# Patient Record
Sex: MALE | Race: WHITE | NOT HISPANIC OR LATINO | Employment: FULL TIME | URBAN - NONMETROPOLITAN AREA
[De-identification: names, ages, dates, MRNs, and addresses within clinical notes are randomized per-mention and may not be internally consistent; named-entity substitution may affect disease eponyms.]

---

## 2017-04-17 ENCOUNTER — OFFICE VISIT - GICH (OUTPATIENT)
Dept: FAMILY MEDICINE | Facility: OTHER | Age: 37
End: 2017-04-17

## 2017-04-17 ENCOUNTER — HISTORY (OUTPATIENT)
Dept: FAMILY MEDICINE | Facility: OTHER | Age: 37
End: 2017-04-17

## 2017-04-17 DIAGNOSIS — J01.00 ACUTE MAXILLARY SINUSITIS: ICD-10-CM

## 2017-09-14 ENCOUNTER — HISTORY (OUTPATIENT)
Dept: FAMILY MEDICINE | Facility: OTHER | Age: 37
End: 2017-09-14

## 2017-09-14 ENCOUNTER — OFFICE VISIT - GICH (OUTPATIENT)
Dept: FAMILY MEDICINE | Facility: OTHER | Age: 37
End: 2017-09-14

## 2017-09-14 DIAGNOSIS — R05.9 COUGH: ICD-10-CM

## 2017-09-14 DIAGNOSIS — J06.9 ACUTE UPPER RESPIRATORY INFECTION: ICD-10-CM

## 2017-11-14 ENCOUNTER — AMBULATORY - GICH (OUTPATIENT)
Dept: FAMILY MEDICINE | Facility: OTHER | Age: 37
End: 2017-11-14

## 2017-11-14 DIAGNOSIS — Z23 ENCOUNTER FOR IMMUNIZATION: ICD-10-CM

## 2017-12-28 NOTE — PATIENT INSTRUCTIONS
Patient Information     Patient Name MRN Sex Mikel Pitt [id number] Male 1980      Patient Instructions by Jordana Guerrero NP at 2017 12:30 PM     Author:  Jordana Guerrero NP Service:  (none) Author Type:  PHYS- Nurse Practitioner     Filed:  2017  1:00 PM Encounter Date:  2017 Status:  Signed     :  Jordana Guerrero NP (PHYS- Nurse Practitioner)            Azithromycin daily x 5 days       Most coughs are caused by a viral infection.   Usually coughs can last 2 to 3 weeks. Sometimes the cough becomes loose (wet) for a few days, and your child coughs up a lot of phlegm (mucus). This is usually a sign that the end of the illness is near.    Most sore throats are caused by viruses and are part of a cold. About 10% of sore throats are caused by strep bacteria.    Encouraged fluids and rest.    May use symptomatic care with tylenol or ibuprofen.     Using a humidifier works well to break up the congestion.     Elevate the mattress to 15 degrees in order to help with the congestion.    Frequent swallows of cool liquid.      Oatmeal or honey coats the throat and some patients find it soothes the pain.     Salt water gargles as needed    Return to clinic with change/worsening of symptoms or concerns.

## 2017-12-28 NOTE — PROGRESS NOTES
Patient Information     Patient Name MRN Sex Mikel Pitt [id number] Male 1980      Progress Notes by Jordana Guerrero NP at 2017 12:30 PM     Author:  Jordana Guerrero NP Service:  (none) Author Type:  PHYS- Nurse Practitioner     Filed:  2017  1:03 PM Encounter Date:  2017 Status:  Signed     :  Jordana Guerrero NP (PHYS- Nurse Practitioner)            HPI:    Mikel Lincoln is a 37 y.o. male who presents to clinic today for URI.   States he has had a URI for the past 3.5 weeks, started to improve and now worsening the past few days.   Also states his wife has pneumonia and is being treated and his children are also on antibiotics.  States he has hit a brick wall the past 3 days.  His symptoms include irritated throat, cough, fatigued, weak, exhausted, nasal/sinus congestion, ears with mild plugged feeling, ringing in ears.  Minimally productive cough - productive initially.  Cough is persistent.  Some chest tightness.  No shortness of breath.  Lots of nasal drainage the past few days.   Sinus pressure and fogging feeling.  No headaches.   Drinking lots of water.  Appetite fair.   Taking airborne and vitamin C.  Taking Mucinex and decongestant.  Smokes leisurely, none recently.          Past Medical History:     Diagnosis  Date     No Significant Past Medical History      Past Surgical History:      Procedure  Laterality Date     APPENDECTOMY       TYMPANOSTOMY       Social History     Substance Use Topics       Smoking status: Current Some Day Smoker     Smokeless tobacco: Never Used     Alcohol use Yes     No current outpatient prescriptions on file.     No current facility-administered medications for this visit.      Medications have been reviewed by me and are current to the best of my knowledge and ability.    No Known Allergies    Past medical history, past surgical history, current medications and allergies reviewed and accurate to the best of my knowledge.         ROS:  Refer to HPI    /72  Pulse 56  Temp 96.2  F (35.7  C) (Tympanic)   Wt 108.7 kg (239 lb 9.6 oz)  BMI 31.18 kg/m2    EXAM:  General Appearance: Well appearing adult male, appropriate appearance for age. No acute distress  Head: normocephalic, atraumatic  Ears: Left TM with bony landmarks appreciated, no erythema, no effusion, no bulging, no purulence.  Right TM with bony landmarks appreciated, no erythema, no effusion, no bulging, no purulence.   Left auditory canal clear.  Right auditory canal clear.  Normal external ears, non tender.  Eyes: conjunctivae normal, no drainage  Orophayrnx: moist mucous membranes, posterior pharynx without erythema, tonsils without hypertrophy, no erythema, no exudates or petechiae, no post nasal drip seen.    Sinuses:  No sinus tenderness upon palpation of the frontal, maxillary, or ethmoid sinuses  Neck: supple without adenopathy  Respiratory: normal chest wall and respirations.  Normal effort.  Clear to auscultation bilaterally, no wheezing, crackles or rhonchi.  No increased work of breathing.  Frequent dry cough appreciated.  Cardiac: RRR with no murmurs  Musculoskeletal:  Normal gait.  Equal movement of bilateral upper extremities.  Equal movement of bilateral lower extremities.    Psychological: normal affect, alert and pleasant          ASSESSMENT/PLAN:    ICD-10-CM    1. Persistent cough for 3 weeks or longer R05 azithromycin (ZITHROMAX Z-LOWELL) 250 mg tablet   2. URI with cough and congestion J06.9          Azithromycin daily x 5 days (z lowell dosing)  Encouraged fluids  Symptomatic treatment - salt water gargles, honey, elevation, humidifier, sinus rinse/netti pot, lozenges, saline nasal spray, etc   Tylenol or ibuprofen PRN  Follow up if symptoms persist or worsen or concerns          Patient Instructions   Azithromycin daily x 5 days       Most coughs are caused by a viral infection.   Usually coughs can last 2 to 3 weeks. Sometimes the cough becomes loose  (wet) for a few days, and your child coughs up a lot of phlegm (mucus). This is usually a sign that the end of the illness is near.    Most sore throats are caused by viruses and are part of a cold. About 10% of sore throats are caused by strep bacteria.    Encouraged fluids and rest.    May use symptomatic care with tylenol or ibuprofen.     Using a humidifier works well to break up the congestion.     Elevate the mattress to 15 degrees in order to help with the congestion.    Frequent swallows of cool liquid.      Oatmeal or honey coats the throat and some patients find it soothes the pain.     Salt water gargles as needed    Return to clinic with change/worsening of symptoms or concerns.

## 2017-12-30 NOTE — NURSING NOTE
Patient Information     Patient Name MRN Sex Mikel Pitt [id number] Male 1980      Nursing Note by Angely Castillo at 2017 12:30 PM     Author:  Angely Castillo Service:  (none) Author Type:  (none)     Filed:  2017 12:53 PM Encounter Date:  2017 Status:  Signed     :  Angely Castillo            Patient presents today for sore throat, congestion, ears ringing, and cant sleep at night.  Patients cough started 4 weeks ago, got a little better, then this week it has gotten worse.    Angely Castillo LPN..............2017 12:42 PM

## 2018-01-04 NOTE — NURSING NOTE
Patient Information     Patient Name MRN Sex Mikel Pitt [id number] Male 1980      Nursing Note by Shelia Prieto at 2017  3:00 PM     Author:  Shelia Prieto Service:  (none) Author Type:  (none)     Filed:  2017  3:30 PM Encounter Date:  2017 Status:  Signed     :  Shelia Prieto            Patient presents to clinic with ear pain and sinus pain.  Shelia Mcdermott ....................  2017   3:18 PM

## 2018-01-04 NOTE — PATIENT INSTRUCTIONS
Patient Information     Patient Name MRN Sex Mikel Pitt [id number] Male 1980      Patient Instructions by Martine Sousa NP at 2017  3:00 PM     Author:  Martine Sousa NP Service:  (none) Author Type:  PHYS- Nurse Practitioner     Filed:  2017  3:31 PM Encounter Date:  2017 Status:  Signed     :  Martine Sousa NP (PHYS- Nurse Practitioner)            Sinusitis   ________________________________________________________________________  KEY POINTS    Sinusitis is swelling and irritation of the linings of the sinuses, the hollow spaces in the bones of your face and front of your skull.    You may need to take medicine for your stuffy nose, pain, infection, or swelling.    Use saline nasal sprays or rinses to help wash out nasal passages if you have a sinus infection. A humidifier can add moisture to the air also.  ________________________________________________________________________  What is sinusitis?  Sinusitis is swelling and irritation of the linings of the sinuses. The sinuses are hollow spaces in the bones of your face and front of your skull. They connect with the nose through small openings. Like the nose, they are lined with tissue (membranes) that make mucus. Mucus drains through the small openings to the nose.  What is the cause?  The passageways from the sinuses to the nose are very narrow. When drainage of mucus from the sinuses is blocked, the sinuses get swollen and irritated. They may also become infected with bacteria, a virus, or even fungus. Allergies or irritation from pollen, mold, dust, or smoke can also cause swelling of the sinuses. Sometimes a tooth infection spreads to the sinuses.  You may be more likely to get sinus congestion and infections if you have:    Severe or untreated seasonal or year-round allergies    Injured the bones in your nose    A deformity of the nose that causes the sinuses not to drain properly    Small growths called  polyps in the sinuses that partially block the sinus openings  What are the symptoms?  Symptoms may include:    Feeling of fullness or pressure in your face or head    A headache that is most painful when you first wake up in the morning or when you bend over and put your head down    Pain in your face    Aching in the upper jaw and teeth    Runny or stuffy nose    Cough, especially at night    Fluid draining down the back of your throat (postnasal drip)    Sore throat, especially in the morning or evening  How is it diagnosed?  Your healthcare provider will ask about your symptoms and medical history and examine you. Tests are often not needed but may include:    X-ray of your sinuses    CT scan, which uses X-rays and a computer to show detailed pictures of the sinuses  How is it treated?  Several kinds of medicine may help:    Nonprescription pain medicine, such as acetaminophen, ibuprofen, or naproxen. Read the label and take as directed. Unless recommended by your healthcare provider, you should not take these medicines for more than 10 days.    Nonsteroidal anti-inflammatory medicines (NSAIDs), such as ibuprofen, naproxen, and aspirin, may cause stomach bleeding and other problems. These risks increase with age.    Acetaminophen may cause liver damage or other problems. Unless recommended by your provider, don't take more than 3000 milligrams (mg) in 24 hours. To make sure you don t take too much, check other medicines you take to see if they also contain acetaminophen. Ask your provider if you need to avoid drinking alcohol while taking this medicine.    Decongestants pills or nasal sprays to reduce swelling in your nose and sinuses and lessen the amount of mucus. Use decongestants as directed. If you are using a nonprescription nasal-spray decongestant, generally you should not use it for more than 3 days. After 3 days it may make your symptoms worse. Ask your healthcare provider if it is OK for you to use a  nasal spray decongestant longer than this.    Antihistamine tablets or a nasal spray to treat the allergies during your allergy season or, in some cases, year-round. Antihistamines block the effect of a chemical your body makes when you have an allergic reaction.    Antibiotics, if your provider thinks you might have a sinus infection    Steroid nasal spray if your provider thinks it may help clear your sinuses  If sinusitis is still a problem despite treatment, you may be referred to an allergy specialist or an ear, nose, and throat (ENT) specialist. The allergy specialist will check for and help treat your allergies to lessen the chance of having sinusitis. The ENT specialist will check for polyps or a deformed bone that may be blocking your sinuses. You may need surgery to create an extra or enlarged passageway to help your sinuses drain more easily.  Depending on what caused the sinusitis and how severe it is, it may last for days or weeks. For most cases of sinusitis, the symptoms get better gradually over 3 to 10 days.  How can I take care of myself?  Follow the full course of treatment prescribed by your healthcare provider. In addition:    If you are taking an antibiotic, take all of it as directed by your provider. If you stop taking the medicine when your symptoms are gone but before you have taken all of the medicine, symptoms may come back.    Don t smoke, and stay away from others who are smoking.    If you have allergies, try to avoid the things you are allergic to, like animal dander. Use medicine to keep your nose and sinuses open.    Use a humidifier to put more moisture in the air. This can help to open blocked sinuses and relieve pain. Avoid steam vaporizers because they can cause burns. Be sure to keep the humidifier clean, as recommended in the 's instructions. It's important to keep bacteria and mold from growing in the water container.    Use saline nasal sprays or rinses to help  wash out nasal passages if you have a sinus infection. You may use the sprays also to prevent infections.    Get plenty of rest.    Drink more fluids to keep the mucus as thin as possible so your sinuses can drain more easily.    Raise the head of your bed slightly or sleep on extra pillows to help your sinuses drain.    Put warm, moist cloths on painful areas.  Ask your healthcare provider:    How and when you will get your test results    How long it will take to recover    If there are activities you should avoid and when you can return to your normal activities    How to take care of yourself at home    What symptoms or problems you should watch for and what to do if you have them  Make sure you know when you should come back for a checkup. Keep all appointments for provider visits or tests.  How can I help prevent sinusitis?    Treat your colds and allergies promptly. Use decongestants as soon as you start having symptoms, and before you fly, travel to high altitudes, or swim in deep water.    If you smoke, try to quit. Talk to your healthcare provider about ways to quit smoking.

## 2018-01-04 NOTE — PROGRESS NOTES
Patient Information     Patient Name MRN Sex Mikel Pitt [id number] Male 1980      Progress Notes by Martine Sousa NP at 2017  3:00 PM     Author:  Martine Sousa NP Service:  (none) Author Type:  PHYS- Nurse Practitioner     Filed:  2017  3:45 PM Encounter Date:  2017 Status:  Signed     :  Martine Sousa NP (PHYS- Nurse Practitioner)            Nursing Notes:   Shelia Prieto  2017  3:30 PM  Signed  Patient presents to clinic with ear pain and sinus pain.  Shelia PrietoLPN ....................  2017   3:18 PM      SUBJECTIVE:    Mikel Lincoln is a 37 y.o. male who presents for ear pain and sinus congestion    URI    This is a new problem. Episode onset: 2 weeks. The problem has been gradually worsening. There has been no fever. Associated symptoms include congestion, headaches, a plugged ear sensation, rhinorrhea, sinus pain and sneezing. Pertinent negatives include no coughing, ear pain, sore throat, swollen glands, vomiting or wheezing. He has tried decongestant, increased fluids and sleep for the symptoms. The treatment provided mild relief.       No current outpatient prescriptions on file prior to visit.     No current facility-administered medications on file prior to visit.        REVIEW OF SYSTEMS:  Review of Systems   HENT: Positive for congestion, rhinorrhea and sneezing. Negative for ear pain and sore throat.    Respiratory: Negative for cough and wheezing.    Gastrointestinal: Negative for vomiting.   Neurological: Positive for headaches.       OBJECTIVE:  /74  Pulse 60  Temp 98.6  F (37  C) (Tympanic)  Resp 20  Wt 105.9 kg (233 lb 6.4 oz)  BMI 30.38 kg/m2    EXAM:   Physical Exam   Constitutional: He is well-developed, well-nourished, and in no distress.   HENT:   Head: Normocephalic and atraumatic.   Right Ear: Tympanic membrane and ear canal normal.   Left Ear: Tympanic membrane and ear canal normal.   Nose: Rhinorrhea  present. Right sinus exhibits maxillary sinus tenderness. Right sinus exhibits no frontal sinus tenderness. Left sinus exhibits maxillary sinus tenderness. Left sinus exhibits no frontal sinus tenderness.   Mouth/Throat: Uvula is midline, oropharynx is clear and moist and mucous membranes are normal.   Sinus pressure noted, not really painful   Eyes: Conjunctivae are normal.   Neck: Neck supple.   Cardiovascular: Normal rate, regular rhythm and normal heart sounds.    Pulmonary/Chest: Effort normal and breath sounds normal. No respiratory distress. He has no wheezes. He has no rales.   Lymphadenopathy:     He has no cervical adenopathy.   Skin: Skin is warm and dry.   Nursing note and vitals reviewed.      ASSESSMENT/PLAN:    ICD-10-CM    1. Acute non-recurrent maxillary sinusitis J01.00 azithromycin (ZITHROMAX) 250 mg tablet        Plan:  Discussed Risks and benefits of abx, vs watchful waiting, he was not having it. He believes he needs an abx right now. Discussed sinusitis and risk of treating too soon. He will also use OTC. I explained my diagnostic considerations and recommendations to the patient, who voiced understanding and agreement with the treatment plan. All questions were answered. We discussed potential side effects of any prescribed or recommended therapies, as well as expectations for response to treatments. He was advised to contact our office if there is no improvement or worsening of conditions or symptoms.  If s/s worsen or persist, patient will either come back or follow up with PCP.       GABRIELLA DIAMOND NP ....................  4/17/2017   3:45 PM

## 2018-01-27 VITALS
BODY MASS INDEX: 31.18 KG/M2 | TEMPERATURE: 96.2 F | HEART RATE: 56 BPM | WEIGHT: 239.6 LBS | SYSTOLIC BLOOD PRESSURE: 114 MMHG | DIASTOLIC BLOOD PRESSURE: 72 MMHG

## 2018-01-27 VITALS
SYSTOLIC BLOOD PRESSURE: 132 MMHG | TEMPERATURE: 98.6 F | WEIGHT: 233.4 LBS | DIASTOLIC BLOOD PRESSURE: 74 MMHG | HEART RATE: 60 BPM | RESPIRATION RATE: 20 BRPM

## 2018-08-21 ENCOUNTER — OFFICE VISIT (OUTPATIENT)
Dept: FAMILY MEDICINE | Facility: OTHER | Age: 38
End: 2018-08-21
Attending: FAMILY MEDICINE
Payer: COMMERCIAL

## 2018-08-21 VITALS
RESPIRATION RATE: 16 BRPM | WEIGHT: 240 LBS | DIASTOLIC BLOOD PRESSURE: 69 MMHG | SYSTOLIC BLOOD PRESSURE: 124 MMHG | HEART RATE: 52 BPM | BODY MASS INDEX: 31.23 KG/M2 | OXYGEN SATURATION: 98 %

## 2018-08-21 DIAGNOSIS — H66.003 ACUTE SUPPURATIVE OTITIS MEDIA OF BOTH EARS WITHOUT SPONTANEOUS RUPTURE OF TYMPANIC MEMBRANES, RECURRENCE NOT SPECIFIED: Primary | ICD-10-CM

## 2018-08-21 PROCEDURE — 99213 OFFICE O/P EST LOW 20 MIN: CPT | Performed by: FAMILY MEDICINE

## 2018-08-21 RX ORDER — AZITHROMYCIN 250 MG/1
TABLET, FILM COATED ORAL
Qty: 6 TABLET | Refills: 0 | Status: SHIPPED | OUTPATIENT
Start: 2018-08-21 | End: 2018-11-29

## 2018-08-21 ASSESSMENT — PAIN SCALES - GENERAL: PAINLEVEL: MILD PAIN (3)

## 2018-08-21 NOTE — NURSING NOTE
Mikel presents to the clinic today for concerns of bilateral ear pain. States that he was swimming this weekend and believes that it developed then. Has been taking ibuprofen to help. Last dose at 7:00 am today.          Shabbir Mayfield LPN 08/21/18 9:21 AM

## 2018-08-21 NOTE — NURSING NOTE
"Chief Complaint   Patient presents with     Ear Problem     Bilateral ear pain       Initial /69 (BP Location: Right arm, Patient Position: Sitting, Cuff Size: Adult Large)  Pulse 52  Resp 16  Wt 240 lb (108.9 kg)  SpO2 98%  BMI 31.23 kg/m2 Estimated body mass index is 31.23 kg/(m^2) as calculated from the following:    Height as of 3/9/16: 6' 1.5\" (1.867 m).    Weight as of this encounter: 240 lb (108.9 kg).  Medication Reconciliation: complete    Shabbir Mayfield LPN    "

## 2018-08-21 NOTE — PROGRESS NOTES
"Nursing Notes:   Shabbir Mayfield LPN  8/21/2018  9:21 AM  Signed  Mikel presents to the clinic today for concerns of bilateral ear pain. States that he was swimming this weekend and believes that it developed then. Has been taking ibuprofen to help. Last dose at 7:00 am today.          Shabbir Mayfield LPN 08/21/18 9:21 AM          Shabbir Mayfield LPN  8/21/2018  9:28 AM  Signed  Chief Complaint   Patient presents with     Ear Problem     Bilateral ear pain       Initial /69 (BP Location: Right arm, Patient Position: Sitting, Cuff Size: Adult Large)  Pulse 52  Resp 16  Wt 240 lb (108.9 kg)  SpO2 98%  BMI 31.23 kg/m2 Estimated body mass index is 31.23 kg/(m^2) as calculated from the following:    Height as of 3/9/16: 6' 1.5\" (1.867 m).    Weight as of this encounter: 240 lb (108.9 kg).  Medication Reconciliation: complete    Shabbir Mayfield LPN        SUBJECTIVE:  Mikel Lincoln is a 38 year old male who reports 1 day history of bilateral ear pain.  It is quite severe.  Reports many years ago having issues with external otitis after swimming.  He was swimming a lot this weekend.  No fevers or chills but has been using ibuprofen for pain.  No other symptoms.    Current Outpatient Prescriptions   Medication Sig Dispense Refill     azithromycin (ZITHROMAX) 250 MG tablet Two tablets first day, then one tablet daily for four days. 6 tablet 0       /69 (BP Location: Right arm, Patient Position: Sitting, Cuff Size: Adult Large)  Pulse 52  Resp 16  Wt 240 lb (108.9 kg)  SpO2 98%  BMI 31.23 kg/m2    Exam:  General Appearance: Pleasant, alert, appropriate appearance for age. No acute distress  Ear Exam: Bilateral TM erythema and bulge/blistering.  TM in tact.  EAC with inflammation but no purulence at present.  Neck Exam: Supple, no masses or nodes.          ASSESSMENT/Plan :        No images are attached to the encounter or orders placed in the encounter.      1. Acute suppurative otitis media of " both ears without spontaneous rupture of tympanic membranes, recurrence not specified  Patient has bilateral otitis media and also seems to have some involvement with external ear canal.  Currently tympanic membranes are intact.  I would suggest treatment with his oral azithromycin to cover both internal ear infection and likely early external ear infection.  We discussed drops and based on current exam I do not think double treatment is necessary.  He can continue to use ibuprofen/Tylenol.  He will follow-up if he has any new or worsening symptoms or if symptoms do not resolve over the next several days.  - azithromycin (ZITHROMAX) 250 MG tablet; Two tablets first day, then one tablet daily for four days.  Dispense: 6 tablet; Refill: 0          There are no Patient Instructions on file for this visit.    Sigifredo Lopez    This document was created using computer generated templates and voiceactivated software.

## 2018-08-21 NOTE — MR AVS SNAPSHOT
"              After Visit Summary   2018    Mikel Lincoln    MRN: [id number]           Patient Information     Date Of Birth          1980        Visit Information        Provider Department      2018 9:00 AM Sigifredo Lopez MD [facility]        Today's Diagnoses     Acute suppurative otitis media of both ears without spontaneous rupture of tympanic membranes, recurrence not specified    -  1       Follow-ups after your visit        Who to contact     If you have questions or need follow up information about today's clinic visit or your schedule please contact [facility] directly at [phone].  Normal or non-critical lab and imaging results will be communicated to you by [email or web address], letter or phone within 4 business days after the clinic has received the results. If you do not hear from us within 7 days, please contact the clinic through [email or web address] or phone. If you have a critical or abnormal lab result, we will notify you by phone as soon as possible.  Submit refill requests through Terviu or call your pharmacy and they will forward the refill request to us. Please allow 3 business days for your refill to be completed.          Additional Information About Your Visit        MyChart Information     Terviu lets you send messages to your doctor, view your test results, renew your prescriptions, schedule appointments and more. To sign up, go to [email or web address] . Click on \"Log in\" on the left side of the screen, which will take you to the Welcome page. Then click on \"Sign up Now\" on the right side of the page.     You will be asked to enter the access code listed below, as well as some personal information. Please follow the directions to create your username and password.     Your access code is: [email or web address]  Expires: 2018  9:53 AM     Your access code will  in 90 days. If you need help or a new code, please call your [place] clinic or [phone].   "      Care EveryWhere ID     This is your Care EveryWhere ID. This could be used by other organizations to access your [place] medical records  [id number]        Your Vitals Were     Pulse Respirations Pulse Oximetry BMI (Body Mass Index)          52 16 98% 31.23 kg/m2         Blood Pressure from Last 3 Encounters:   08/21/18 124/69   09/14/17 114/72   04/17/17 132/74    Weight from Last 3 Encounters:   08/21/18 240 lb (108.9 kg)   09/14/17 239 lb 9.6 oz (108.7 kg)   04/17/17 233 lb 6.4 oz (105.9 kg)              Today, you had the following     No orders found for display         Today's Medication Changes          These changes are accurate as of 8/21/18  9:53 AM.  If you have any questions, ask your nurse or doctor.               Start taking these medicines.        Dose/Directions    azithromycin 250 MG tablet   Commonly known as:  ZITHROMAX   Used for:  Acute suppurative otitis media of both ears without spontaneous rupture of tympanic membranes, recurrence not specified   Started by:  Sigifredo Lopze MD        Two tablets first day, then one tablet daily for four days.   Quantity:  6 tablet   Refills:  0            Where to get your medicines      These medications were sent to [facility], - [place], [place] - [address]  [address] , [place] [address]     Phone:  [phone]     azithromycin 250 MG tablet                Primary Care Provider Office Phone # Fax #    Ryley VEGA MD Anthony [phone]       [address] [facility] [address]        Equal Access to Services     [facility] FREDI AH: Hadii mayo Guerrero, waaxda luqadaha, qaybta kaalmada sajan owens. So [facility] [phone].    ATENCIÓN: Si habla español, tiene a sevilla disposición servicios gratuitos de asistencia lingüística. Llame al [phone].    We comply with applicable federal civil rights laws and [place] laws. We do not  discriminate on the basis of race, color, national origin, age, disability, sex, sexual orientation, or gender identity.            Thank you!     Thank you for choosing [facility]  for your care. Our goal is always to provide you with excellent care. Hearing back from our patients is one way we can continue to improve our services. Please take a few minutes to complete the written survey that you may receive in the mail after your visit with us. Thank you!             Your Updated Medication List - Protect others around you: Learn how to safely use, store and throw away your medicines at www.disposemymeds.org.          This list is accurate as of 8/21/18  9:53 AM.  Always use your most recent med list.                   Brand Name Dispense Instructions for use Diagnosis    azithromycin 250 MG tablet    ZITHROMAX    6 tablet    Two tablets first day, then one tablet daily for four days.    Acute suppurative otitis media of both ears without spontaneous rupture of tympanic membranes, recurrence not specified

## 2018-08-23 ENCOUNTER — TELEPHONE (OUTPATIENT)
Dept: FAMILY MEDICINE | Facility: OTHER | Age: 38
End: 2018-08-23

## 2018-08-23 ENCOUNTER — NURSE TRIAGE (OUTPATIENT)
Dept: FAMILY MEDICINE | Facility: OTHER | Age: 38
End: 2018-08-23

## 2018-08-23 DIAGNOSIS — H60.393 INFECTIVE OTITIS EXTERNA, BILATERAL: Primary | ICD-10-CM

## 2018-08-23 NOTE — TELEPHONE ENCOUNTER
"Patient called today, stating he was seen for ear infection and it is not getting any better.    Per OV notes on 8/21/18:  Pt diagnosed with bilateral otitis media and treated with oral azithromycin (ZITHROMAX) 250 MG tablet and instructed to take two tablets first day, then one tablet daily for four days. Pt told to f/u if he had any new symptoms or if symptoms did not resolve over the following several days.    Patient triaged over the phone.    Reason for Disposition    [1] Taking antibiotic > 72 hours (3 days) and [2] pain persists or recurs    Additional Information    Negative: [1] Stiff neck (unable to touch chin to chest) AND [2] fever    Negative: [1] Bony area of skull behind the ear is pink or swollen AND [2] fever    Negative: Fever > 104 F (40 C)    Negative: Patient sounds very sick or weak to the triager    Negative: [1] SEVERE pain and [2] not improved 2 hours after analgesic medication (e.g., ibuprofen or acetaminophen)    Negative: Walking is very unsteady or dizziness    Negative: [1] Vomited AND [2] 3 or more times    [1] Taking antibiotic < 72 hours (3 days) and [2] pain persists    Answer Assessment - Initial Assessment Questions  1. ANTIBIOTIC: \"What antibiotic are you receiving?\" \"How many times per day?\"  Z-nisha  Per Chart: Azithromycin (ZITHROMAX) 250 MG tablet; Two tablets first day, then one tablet daily for four days.       2. ONSET: \"When was the antibiotic started?\"  08/21/18    3. PAIN: \"How bad is the pain?\"   (Scale 1-10; mild, moderate or severe)  MODERATE  interferes with normal activities or awakens from sleep     4. FEVER: \"Do you have a fever?\" If so, ask: \"What is your temperature, how was it measured, and when did it start?\"  Denies    5. DISCHARGE: \"Is there any discharge from the ear?\"  Denies    6. OTHER SYMPTOMS: \"Do you have any other symptoms?\" (e.g., headache, stiff neck, dizziness, vomiting, runny nose)  Clogged ears, \"can't hear, everything is muffled\", has 3 kids and " all the noise is heightened, neck is tense but not stiff, headache, dizzy, can't sit at computer for more than 30 minutes at a time, attended a  today and couldn't wait to get in his truck due to all of the noise in the reception colmenares, severe and constant ringing in both ears     Dr. Lopez talked about the option of double-treating for internal and external ear, but decided not to prescribe ear drops. Pt wondering if he needs these.    Protocols used: EAR - OTITIS MEDIA FOLLOW-UP CALL-[facility]-    Lizz Longoria RN .............. 2018  4:14 PM

## 2018-08-23 NOTE — TELEPHONE ENCOUNTER
"S-(situation):   Patient called today, stating he was seen for ear infection and it is not getting any better.    B-(background):   Per OV notes on 18, Pt diagnosed with bilateral otitis media and treated with oral azithromycin (ZITHROMAX) 250 MG tablet and instructed to take two tablets first day, then one tablet daily for four days. Provider elected not to double treat with drops at that time. Pt told to f/u if he had any new symptoms or if symptoms did not resolve over the following several days.    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    Denies: Stiff neck, fever, discharge from ear, vomiting    R-(recommendations):   Dr. Lopez talked about the option of double-treating for internal and external ear, but decided not to prescribe ear drops. Pt wondering if he needs these or a different antibiotic.    In the absence of Dr. Lopez, who saw Patient for this problem and Patient's PCP Dr. Cruz, writer will send to Dr. Primo MANNING for consideration.    Lizz Longoria RN .............. 2018  4:19 PM      "

## 2018-08-24 RX ORDER — NEOMYCIN SULFATE, POLYMYXIN B SULFATE AND HYDROCORTISONE 10; 3.5; 1 MG/ML; MG/ML; [USP'U]/ML
4 SUSPENSION/ DROPS AURICULAR (OTIC) 4 TIMES DAILY
Qty: 10 ML | Refills: 0 | OUTPATIENT
Start: 2018-08-24 | End: 2018-11-29

## 2018-08-24 NOTE — TELEPHONE ENCOUNTER
Pt is asking about ear drops, when he was seen by John, he suggested the drops but did not fill the Rx. Could he get this Rx  Maria Del Rosario Jackson LPN on 8/24/2018 at 10:37 AM

## 2018-08-24 NOTE — TELEPHONE ENCOUNTER
Called patient and wants RX to go to [facility]. Cancelled at [facility]  And called into [facility] . Cindy Escalante LPN ....................8/24/2018  11:38 AM

## 2018-08-24 NOTE — TELEPHONE ENCOUNTER
Patient will need to be seen.  This may be just fluid that needs to resolve or continued infection.

## 2018-08-28 ENCOUNTER — OFFICE VISIT (OUTPATIENT)
Dept: FAMILY MEDICINE | Facility: OTHER | Age: 38
End: 2018-08-28
Attending: NURSE PRACTITIONER
Payer: COMMERCIAL

## 2018-08-28 VITALS
BODY MASS INDEX: 32.48 KG/M2 | DIASTOLIC BLOOD PRESSURE: 80 MMHG | WEIGHT: 249.6 LBS | HEART RATE: 64 BPM | SYSTOLIC BLOOD PRESSURE: 120 MMHG

## 2018-08-28 DIAGNOSIS — H60.392 ACUTE INFECTION OF LEFT EXTERNAL EAR: ICD-10-CM

## 2018-08-28 DIAGNOSIS — H65.91 FLUID LEVEL BEHIND TYMPANIC MEMBRANE OF RIGHT EAR: Primary | ICD-10-CM

## 2018-08-28 PROCEDURE — 99213 OFFICE O/P EST LOW 20 MIN: CPT | Performed by: NURSE PRACTITIONER

## 2018-08-28 ASSESSMENT — PAIN SCALES - GENERAL: PAINLEVEL: NO PAIN (0)

## 2018-08-28 NOTE — MR AVS SNAPSHOT
After Visit Summary   8/28/2018    Mikel Lincoln    MRN: [id number]           Patient Information     Date Of Birth          1980        Visit Information        Provider Department      8/28/2018 1:30 PM Clair Bae CNP [facility]        Today's Diagnoses     Fluid level behind tympanic membrane of right ear    -  1    Acute infection of left external ear          Care Instructions      ASSESSMENT/PLAN:     1. Fluid level behind tympanic membrane of right ear  Most likely secondary to acute ear infection.  Can take 8-12 weeks to spontaneously resolve. If worsening or no improvement in symptoms return for further evaluation- possible ENT referral.    2. Acute infection of left external ear  Continue to use ear drops.        Clair Bae CNP  [facility]    External Ear Infection (Adult)    External otitis (also called  swimmer s ear ) is an infection in the ear canal. It is often caused by bacteria or fungus. It can occur a few days after water gets trapped in the ear canal (from swimming or bathing). It can also occur after cleaning too deeply in the ear canal with a cotton swab or other object. Sometimes, hair care products get into the ear canal and cause this problem.  Symptoms can include pain, fever, itching, redness, drainage, or swelling of the ear canal. Temporary hearing loss may also occur.  Home care    Do not try to clean the ear canal. This can push pus and bacteria deeper into the canal.    Use prescribed ear drops as directed. These help reduce swelling and fight the infection. If an ear wick was placed in the ear canal, apply drops right onto the end of the wick. The wick will draw the medicine into the ear canal even if it is swollen closed.    A cotton ball may be loosely placed in the outer ear to absorb any drainage.    You may use acetaminophen or ibuprofen to control pain, unless another medicine was prescribed. Note: If  you have chronic liver or kidney disease or ever had a stomach ulcer or GI bleeding, talk to your healthcare provider before taking any of these medicines.    Do not allow water to get into your ear when bathing. Also, don't swim until the infection has cleared.  Prevention    Keep your ears dry. This helps lower the risk of infection. Dry your ears with a towel or hair dryer after getting wet. Also, use ear plugs when swimming.    Do not stick any objects in the ear to remove wax.    If you feel water trapped in your ear, use ear drops right away. You can get these drops over the counter at most drugstores. They work by removing water from the ear canal.  Follow-up care  Follow up with your healthcare provider in 1 week, or as advised.  When to seek medical advice  Call your healthcare provider right away if any of these occur:    Ear pain becomes worse or doesn t improve after 3 days of treatment    Redness or swelling of the outer ear occurs or gets worse    Headache    Painful or stiff neck    Drowsiness or confusion    Fever of 100.4 F (38 C) or higher, or as directed by your healthcare provider    Seizure  Date Last Reviewed: 10/1/2017    3522-3469 [employer]. [address] [place] [address]. All rights reserved. This information is not intended as a substitute for professional medical care. Always follow your healthcare professional's instructions.        Earache, No Infection (Adult)  Earaches can happen without an infection. This occurs when air and fluid build up behind the eardrum causing a feeling of fullness and discomfort and reduced hearing. This is called otitis media with effusion (OME) or serous otitis media. It means there is fluid in the middle ear. It is not the same as acute otitis media, which is typically from infection.  OME can happen when you have a cold if congestion blocks the passage that drains the middle ear. This passage is called the eustachian tube. OME may  also occur with nasal allergies or after a bacterial middle ear infection.    The pain or discomfort may come and go. You may hear clicking or popping sounds when you chew or swallow. You may feel that your balance is off. Or you may hear ringing in the ear.  It often takes from several weeks up to 3 months for the fluid to clear on its own. Oral pain relievers and ear drops help if there is pain. Decongestants and antihistamines sometimes help. Antibiotics don't help since there is no infection. Your doctor may prescribe a nasal spray to help reduce swelling in the nose and eustachian tube. This can allow the ear to drain.  If your OME doesn't improve after 3 months, surgery may be used to drain the fluid and insert a small tube in the eardrum to allow continued drainage.  Because the middle ear fluid can become infected, it is important to watch for signs of an ear infection which may develop later. These signs include increased ear pain, fever, or drainage from the ear.  Home care  The following guidelines will help you care for yourself at home:    You may use over-the-counter medicine as directed to control pain, unless another medicine was prescribed. If you have chronic liver or kidney disease or ever had a stomach ulcer or GI bleeding, talk with your doctor before using these medicines. Aspirin should never be used in anyone under 18 years of age who is ill with a fever. It may cause severe liver damage.    You may use over-the-counter decongestants such as phenylephrine or pseudoephedrine. But they are not always helpful. Don't use nasal spray decongestants more than 3 days. Longer use can make congestion worse. Prescription nasal sprays from your doctor don't typically have those restrictions.    Antihistamines may help if you are also having allergy symptoms.    You may use medicines such as guaifenesin to thin mucus and promote drainage.  Follow-up care  Follow up with your healthcare provider or as  "advised if you are not feeling better after 3 days.  When to seek medical advice  Call your healthcare provider right away if any of the following occur:    Your ear pain gets worse or does not start to improve     Fever of 100.4 F (38 C) or higher, or as directed by your healthcare provider    Fluid or blood draining from the ear    Headache or sinus pain    Stiff neck    Unusual drowsiness or confusion  Date Last Reviewed: 10/1/2016    8220-1104 [employer]. [address] [place] [address]. All rights reserved. This information is not intended as a substitute for professional medical care. Always follow your healthcare professional's instructions.                Follow-ups after your visit        Who to contact     If you have questions or need follow up information about today's clinic visit or your schedule please contact [facility] directly at [phone].  Normal or non-critical lab and imaging results will be communicated to you by UberGrapehart, letter or phone within 4 business days after the clinic has received the results. If you do not hear from us within 7 days, please contact the clinic through UberGrapehart or phone. If you have a critical or abnormal lab result, we will notify you by phone as soon as possible.  Submit refill requests through CrossFiber or call your pharmacy and they will forward the refill request to us. Please allow 3 business days for your refill to be completed.          Additional Information About Your Visit        UberGrapeharJack On Block Information     CrossFiber lets you send messages to your doctor, view your test results, renew your prescriptions, schedule appointments and more. To sign up, go to [email or web address] . Click on \"Log in\" on the left side of the screen, which will take you to the Welcome page. Then click on \"Sign up Now\" on the right side of the page.     You will be asked to enter the access code listed below, as well as some personal " information. Please follow the directions to create your username and password.     Your access code is: [email or web address]  Expires: 2018  9:53 AM     Your access code will  in 90 days. If you need help or a new code, please call your [place] clinic or [phone].        Care EveryWhere ID     This is your Care EveryWhere ID. This could be used by other organizations to access your [place] medical records  [id number]        Your Vitals Were     Pulse BMI (Body Mass Index)                64 32.48 kg/m2           Blood Pressure from Last 3 Encounters:   18 120/80   18 124/69   17 114/72    Weight from Last 3 Encounters:   18 249 lb 9.6 oz (113.2 kg)   18 240 lb (108.9 kg)   17 239 lb 9.6 oz (108.7 kg)              Today, you had the following     No orders found for display       Primary Care Provider Office Phone # Fax #    Ryley VEGA MD Anthony [phone]       [address] [facility] [address]        Equal Access to Services     [facility] AH: Hadii aad ku hadasho Sotoddali, waaxda luqadaha, qaybta kaalmada adeegyada, sajan cueto . So [facility] [phone].    ATENCIÓN: Si habla español, tiene a sevilla disposición servicios gratuitos de asistencia lingüística. Llame al [phone].    We comply with applicable federal civil rights laws and [place] laws. We do not discriminate on the basis of race, color, national origin, age, disability, sex, sexual orientation, or gender identity.            Thank you!     Thank you for choosing [facility] [place]  for your care. Our goal is always to provide you with excellent care. Hearing back from our patients is one way we can continue to improve our services. Please take a few minutes to complete the written survey that you may receive in the mail after your visit with us. Thank you!             Your Updated Medication List - Protect others around you: Learn how to  safely use, store and throw away your medicines at www.disposemymeds.org.          This list is accurate as of 8/28/18  1:54 PM.  Always use your most recent med list.                   Brand Name Dispense Instructions for use Diagnosis    azithromycin 250 MG tablet    ZITHROMAX    6 tablet    Two tablets first day, then one tablet daily for four days.    Acute suppurative otitis media of both ears without spontaneous rupture of tympanic membranes, recurrence not specified       neomycin-polymyxin-hydrocortisone [id number] otic suspension    CORTISPORIN    10 mL    Place 4 drops into both ears 4 times daily    Infective otitis externa, bilateral

## 2018-08-28 NOTE — PROGRESS NOTES
SUBJECTIVE:   Mikel Lincoln is a 38 year old male who presents to clinic today for the following health issues:    Concern - Ear issue  Onset: About 1 week    Description: ringing, clogged sensations left worse than right. Discomfort has resolved    Intensity: moderate    Progression of Symptoms:  Pain has improved, ringing/clogged sensation continues    Accompanying Signs & Symptoms: Denies cough, sneeze, rhinorrhea, congestion, sinus pressure, fever, chills.    Previous history of similar problem: Was seen last week and treated for bilateral AOM. Called due to worsening discomfort and started on ear drops.    Precipitating factors:   Worsened by: Nothing    Alleviating factors:  Improved by: Nothing    Therapies Tried and outcome: Nothing      Problem list and histories reviewed & adjusted, as indicated.  Additional history: as documented    Patient Active Problem List   Diagnosis     Annual physical exam     Athlete's foot     Fatigue     Jock itch     Light headed     Past Surgical History:   Procedure Laterality Date     APPENDECTOMY OPEN      No Comments Provided     TYMPANOSTOMY, LOCAL/TOPICAL ANESTHESIA      No Comments Provided       Social History   Substance Use Topics     Smoking status: Current Some Day Smoker     Types: Cigarettes     Smokeless tobacco: Never Used     Alcohol use Yes      Comment: Social     Family History   Problem Relation Age of Onset     Arthritis Father      Arthritis,RA     Arthritis Paternal Grandmother      Arthritis,RA     Family History Negative Mother      Good Health         Current Outpatient Prescriptions   Medication Sig Dispense Refill     azithromycin (ZITHROMAX) 250 MG tablet Two tablets first day, then one tablet daily for four days. 6 tablet 0     neomycin-polymyxin-hydrocortisone (CORTISPORIN) 3.5-58737-0 otic suspension Place 4 drops into both ears 4 times daily 10 mL 0     No Known Allergies  Recent Labs   Lab Test  03/09/16   1801   LDL  164*   HDL  58   TRIG   219*   CR  0.86   GFRESTBLACK  >60   POTASSIUM  3.9      BP Readings from Last 3 Encounters:   08/28/18 120/80   08/21/18 124/69   09/14/17 114/72    Wt Readings from Last 3 Encounters:   08/28/18 249 lb 9.6 oz (113.2 kg)   08/21/18 240 lb (108.9 kg)   09/14/17 239 lb 9.6 oz (108.7 kg)           Reviewed and updated as needed this visit by clinical staff  Tobacco  Allergies  Meds  Med Hx  Surg Hx  Fam Hx  Soc Hx      Reviewed and updated as needed this visit by Provider         ROS:    Constitutional, HEENT, cardiovascular, pulmonary, gi and gu systems are negative, except as otherwise noted.    OBJECTIVE:     /80 (BP Location: Right arm, Patient Position: Sitting, Cuff Size: Adult Large)  Pulse 64  Wt 249 lb 9.6 oz (113.2 kg)  BMI 32.48 kg/m2  Body mass index is 32.48 kg/(m^2).     GENERAL: healthy, alert and no distress  EYES: Eyes grossly normal to inspection, PERRLA and conjunctivae and sclerae normal  HENT: Right ear canal normal without erythema or drainage, Right TM intact, translucent, positive light reflex with clear fluid  Left ear canal erythematous, swollen with thick, white discharge- unable to visualize TM.  nose and mouth without ulcers or lesions  NECK: no adenopathy, no asymmetry, masses, or scar  RESP: lungs clear to auscultation - no rales, rhonchi or wheezes  CV: regular rate and rhythm, normal S1 S2, no S3 or S4, no murmur, click or rub  NEURO: Normal strength and tone, mentation intact and speech normal  PSYCH: mentation appears normal, affect normal    Diagnostic Test Results:  none     ASSESSMENT/PLAN:     1. Fluid level behind tympanic membrane of right ear  Most likely secondary to acute ear infection.  Can take 8-12 weeks to spontaneously resolve. If worsening or no improvement in symptoms return for further evaluation- possible ENT referral.    2. Acute infection of left external ear  Suspecting AOM resolved since he really does not have pain and continuing to use ear drops  for 4 more days as he is on day 3 should help improve external infection of ear.      Follow-up if no improvement or worsening symptoms.      Clair Bae CNP  [facility]

## 2018-08-28 NOTE — NURSING NOTE
Patient presents to the clinic for a follow up regarding ears.  Drew Avila ..............8/28/2018 1:31 PM     None known Unable to assess

## 2018-08-28 NOTE — PATIENT INSTRUCTIONS
ASSESSMENT/PLAN:     1. Fluid level behind tympanic membrane of right ear  Most likely secondary to acute ear infection.  Can take 8-12 weeks to spontaneously resolve. If worsening or no improvement in symptoms return for further evaluation- possible ENT referral.    2. Acute infection of left external ear  Continue to use ear drops.        Clair Bae CNP  [facility]    External Ear Infection (Adult)    External otitis (also called  swimmer s ear ) is an infection in the ear canal. It is often caused by bacteria or fungus. It can occur a few days after water gets trapped in the ear canal (from swimming or bathing). It can also occur after cleaning too deeply in the ear canal with a cotton swab or other object. Sometimes, hair care products get into the ear canal and cause this problem.  Symptoms can include pain, fever, itching, redness, drainage, or swelling of the ear canal. Temporary hearing loss may also occur.  Home care    Do not try to clean the ear canal. This can push pus and bacteria deeper into the canal.    Use prescribed ear drops as directed. These help reduce swelling and fight the infection. If an ear wick was placed in the ear canal, apply drops right onto the end of the wick. The wick will draw the medicine into the ear canal even if it is swollen closed.    A cotton ball may be loosely placed in the outer ear to absorb any drainage.    You may use acetaminophen or ibuprofen to control pain, unless another medicine was prescribed. Note: If you have chronic liver or kidney disease or ever had a stomach ulcer or GI bleeding, talk to your healthcare provider before taking any of these medicines.    Do not allow water to get into your ear when bathing. Also, don't swim until the infection has cleared.  Prevention    Keep your ears dry. This helps lower the risk of infection. Dry your ears with a towel or hair dryer after getting wet. Also, use ear plugs when  swimming.    Do not stick any objects in the ear to remove wax.    If you feel water trapped in your ear, use ear drops right away. You can get these drops over the counter at most drugstores. They work by removing water from the ear canal.  Follow-up care  Follow up with your healthcare provider in 1 week, or as advised.  When to seek medical advice  Call your healthcare provider right away if any of these occur:    Ear pain becomes worse or doesn t improve after 3 days of treatment    Redness or swelling of the outer ear occurs or gets worse    Headache    Painful or stiff neck    Drowsiness or confusion    Fever of 100.4 F (38 C) or higher, or as directed by your healthcare provider    Seizure  Date Last Reviewed: 10/1/2017    7500-7341 [employer]. [address] [place] [address], [place] [address]. All rights reserved. This information is not intended as a substitute for professional medical care. Always follow your healthcare professional's instructions.        Earache, No Infection (Adult)  Earaches can happen without an infection. This occurs when air and fluid build up behind the eardrum causing a feeling of fullness and discomfort and reduced hearing. This is called otitis media with effusion (OME) or serous otitis media. It means there is fluid in the middle ear. It is not the same as acute otitis media, which is typically from infection.  OME can happen when you have a cold if congestion blocks the passage that drains the middle ear. This passage is called the eustachian tube. OME may also occur with nasal allergies or after a bacterial middle ear infection.    The pain or discomfort may come and go. You may hear clicking or popping sounds when you chew or swallow. You may feel that your balance is off. Or you may hear ringing in the ear.  It often takes from several weeks up to 3 months for the fluid to clear on its own. Oral pain relievers and ear drops help if there is pain. Decongestants and  antihistamines sometimes help. Antibiotics don't help since there is no infection. Your doctor may prescribe a nasal spray to help reduce swelling in the nose and eustachian tube. This can allow the ear to drain.  If your OME doesn't improve after 3 months, surgery may be used to drain the fluid and insert a small tube in the eardrum to allow continued drainage.  Because the middle ear fluid can become infected, it is important to watch for signs of an ear infection which may develop later. These signs include increased ear pain, fever, or drainage from the ear.  Home care  The following guidelines will help you care for yourself at home:    You may use over-the-counter medicine as directed to control pain, unless another medicine was prescribed. If you have chronic liver or kidney disease or ever had a stomach ulcer or GI bleeding, talk with your doctor before using these medicines. Aspirin should never be used in anyone under 18 years of age who is ill with a fever. It may cause severe liver damage.    You may use over-the-counter decongestants such as phenylephrine or pseudoephedrine. But they are not always helpful. Don't use nasal spray decongestants more than 3 days. Longer use can make congestion worse. Prescription nasal sprays from your doctor don't typically have those restrictions.    Antihistamines may help if you are also having allergy symptoms.    You may use medicines such as guaifenesin to thin mucus and promote drainage.  Follow-up care  Follow up with your healthcare provider or as advised if you are not feeling better after 3 days.  When to seek medical advice  Call your healthcare provider right away if any of the following occur:    Your ear pain gets worse or does not start to improve     Fever of 100.4 F (38 C) or higher, or as directed by your healthcare provider    Fluid or blood draining from the ear    Headache or sinus pain    Stiff neck    Unusual drowsiness or confusion  Date Last  Reviewed: 10/1/2016    2995-9176 [email or web address]. [address] [place] [address], [place] [address]. All rights reserved. This information is not intended as a substitute for professional medical care. Always follow your healthcare professional's instructions.

## 2018-09-25 ENCOUNTER — TELEPHONE (OUTPATIENT)
Dept: FAMILY MEDICINE | Facility: OTHER | Age: 38
End: 2018-09-25

## 2018-09-25 NOTE — TELEPHONE ENCOUNTER
"Pt. Calling and requesting a prescription for \"Denavir.\"   States \"I am out of town and need some.\"  "

## 2018-09-26 NOTE — TELEPHONE ENCOUNTER
Left message to call back....................  9/26/2018   8:17 AM  Marlena Tesfaye LPN  9/26/2018  8:17 AM

## 2018-10-01 NOTE — TELEPHONE ENCOUNTER
The patient states he is home now and does not need it.  Erica Altman LPN..................10/1/2018   4:11 PM

## 2018-11-06 ENCOUNTER — ALLIED HEALTH/NURSE VISIT (OUTPATIENT)
Dept: FAMILY MEDICINE | Facility: OTHER | Age: 38
End: 2018-11-06
Attending: FAMILY MEDICINE
Payer: COMMERCIAL

## 2018-11-06 DIAGNOSIS — Z23 NEED FOR PROPHYLACTIC VACCINATION AND INOCULATION AGAINST INFLUENZA: Primary | ICD-10-CM

## 2018-11-06 PROCEDURE — 90686 IIV4 VACC NO PRSV 0.5 ML IM: CPT

## 2018-11-06 PROCEDURE — 90471 IMMUNIZATION ADMIN: CPT

## 2018-11-06 NOTE — MR AVS SNAPSHOT
"              After Visit Summary   2018    Mikel Lincoln    MRN: [id number]           Patient Information     Date Of Birth          1980        Visit Information        Provider Department      2018 9:00 AM  INJECTION NURSE [facility]        Today's Diagnoses     Need for prophylactic vaccination and inoculation against influenza    -  1       Follow-ups after your visit        Who to contact     If you have questions or need follow up information about today's clinic visit or your schedule please contact [facility] directly at [phone].  Normal or non-critical lab and imaging results will be communicated to you by [employer], letter or phone within 4 business days after the clinic has received the results. If you do not hear from us within 7 days, please contact the clinic through [employer] or phone. If you have a critical or abnormal lab result, we will notify you by phone as soon as possible.  Submit refill requests through [employer] or call your pharmacy and they will forward the refill request to us. Please allow 3 business days for your refill to be completed.          Additional Information About Your Visit        MyChart Information     [employer] lets you send messages to your doctor, view your test results, renew your prescriptions, schedule appointments and more. To sign up, go to [email or web address] . Click on \"Log in\" on the left side of the screen, which will take you to the Welcome page. Then click on \"Sign up Now\" on the right side of the page.     You will be asked to enter the access code listed below, as well as some personal information. Please follow the directions to create your username and password.     Your access code is: [email or web address]  Expires: 2018  8:53 AM     Your access code will  in 90 days. If you need help or a new code, please call your [facility] or [phone].        Care EveryWhere ID     This is your " Care EveryWhere ID. This could be used by other organizations to access your [place] medical records  [id number]         Blood Pressure from Last 3 Encounters:   08/28/18 120/80   08/21/18 124/69   09/14/17 114/72    Weight from Last 3 Encounters:   08/28/18 249 lb 9.6 oz (113.2 kg)   08/21/18 240 lb (108.9 kg)   09/14/17 239 lb 9.6 oz (108.7 kg)              We Performed the Following     HC FLU VAC PRESRV FREE QUAD SPLIT VIR 3+YRS IM     Vaccine Administration, Initial [[id number]]        Primary Care Provider Office Phone # Fax #    Ryley VEGA [phone] [address] [facility] [address]        Equal Access to Services     RILEY ARI : Hadii aad ku hadasho Somichelle, waaxda luqadaha, qaybta kaalmada adeegyada, sajan cueto . So [facility] [phone].    ATENCIÓN: Si habla español, tiene a sevilla disposición servicios gratuitos de asistencia lingüística. Llame al [phone].    We comply with applicable federal civil rights laws and [place] laws. We do not discriminate on the basis of race, color, national origin, age, disability, sex, sexual orientation, or gender identity.            Thank you!     Thank you for choosing [facility]  for your care. Our goal is always to provide you with excellent care. Hearing back from our patients is one way we can continue to improve our services. Please take a few minutes to complete the written survey that you may receive in the mail after your visit with us. Thank you!             Your Updated Medication List - Protect others around you: Learn how to safely use, store and throw away your medicines at www.disposemymeds.org.          This list is accurate as of 11/6/18  9:40 AM.  Always use your most recent med list.                   Brand Name Dispense Instructions for use Diagnosis    azithromycin 250 MG tablet    ZITHROMAX    6 tablet    Two tablets first day, then one tablet daily for  four days.    Acute suppurative otitis media of both ears without spontaneous rupture of tympanic membranes, recurrence not specified       neomycin-polymyxin-hydrocortisone 3.5-58270-7 otic suspension    CORTISPORIN    10 mL    Place 4 drops into both ears 4 times daily    Infective otitis externa, bilateral

## 2018-11-06 NOTE — PROGRESS NOTES
Injectable Influenza Immunization Documentation    1.  Is the person to be vaccinated sick today?   No    2. Does the person to be vaccinated have an allergy to a component   of the vaccine?   No  Egg Allergy Algorithm Link    3. Has the person to be vaccinated ever had a serious reaction   to influenza vaccine in the past?   No    4. Has the person to be vaccinated ever had Guillain-Barré syndrome?   No    Form completed by Dina Arriola LPN.........................11/6/2018  9:36 AM             Injectable Influenza Immunization Documentation    1.  Is the person to be vaccinated sick today?   No    2. Does the person to be vaccinated have an allergy to a component   of the vaccine?   No  Egg Allergy Algorithm Link    3. Has the person to be vaccinated ever had a serious reaction   to influenza vaccine in the past?   No    4. Has the person to be vaccinated ever had Guillain-Barré syndrome?   No    Form completed by Dina Arriola LPN.........................11/6/2018  9:39 AM

## 2018-11-29 ENCOUNTER — OFFICE VISIT (OUTPATIENT)
Dept: FAMILY MEDICINE | Facility: OTHER | Age: 38
End: 2018-11-29
Attending: FAMILY MEDICINE
Payer: COMMERCIAL

## 2018-11-29 ENCOUNTER — TELEPHONE (OUTPATIENT)
Dept: FAMILY MEDICINE | Facility: OTHER | Age: 38
End: 2018-11-29

## 2018-11-29 VITALS
WEIGHT: 237 LBS | TEMPERATURE: 97.9 F | DIASTOLIC BLOOD PRESSURE: 82 MMHG | RESPIRATION RATE: 16 BRPM | BODY MASS INDEX: 30.84 KG/M2 | HEART RATE: 72 BPM | SYSTOLIC BLOOD PRESSURE: 124 MMHG

## 2018-11-29 DIAGNOSIS — J02.0 STREPTOCOCCAL SORE THROAT: Primary | ICD-10-CM

## 2018-11-29 LAB
DEPRECATED S PYO AG THROAT QL EIA: ABNORMAL
SPECIMEN SOURCE: ABNORMAL

## 2018-11-29 PROCEDURE — 99213 OFFICE O/P EST LOW 20 MIN: CPT | Performed by: FAMILY MEDICINE

## 2018-11-29 PROCEDURE — 87880 STREP A ASSAY W/OPTIC: CPT | Performed by: FAMILY MEDICINE

## 2018-11-29 RX ORDER — AMOXICILLIN 875 MG
875 TABLET ORAL 2 TIMES DAILY
Qty: 20 TABLET | Refills: 0 | Status: SHIPPED | OUTPATIENT
Start: 2018-11-29 | End: 2019-02-28

## 2018-11-29 ASSESSMENT — PAIN SCALES - GENERAL: PAINLEVEL: MODERATE PAIN (4)

## 2018-11-29 NOTE — NURSING NOTE
"Chief Complaint   Patient presents with     Pharyngitis     for 4 days       Initial /82  Pulse 72  Temp 97.9  F (36.6  C) (Tympanic)  Resp 16  Wt 237 lb (107.5 kg)  BMI 30.84 kg/m2 Estimated body mass index is 30.84 kg/(m^2) as calculated from the following:    Height as of 3/9/16: 6' 1.5\" (1.867 m).    Weight as of this encounter: 237 lb (107.5 kg).  Medication Reconciliation: complete    Erica Altman LPN     The patient started having ear pain, now he has been having a sore throat for the past 4 days.   Erica Altman LPN..................11/29/2018   3:21 PM    "

## 2018-11-29 NOTE — MR AVS SNAPSHOT
After Visit Summary   11/29/2018    Mikel Lincoln    MRN: [id number]           Patient Information     Date Of Birth          1980        Visit Information        Provider Department      11/29/2018 3:00 PM Ryley Cruz MD [facility]        Today's Diagnoses     Streptococcal sore throat    -  1       Follow-ups after your visit        Who to contact     If you have questions or need follow up information about today's clinic visit or your schedule please contact [facility] directly at [phone].  Normal or non-critical lab and imaging results will be communicated to you by MyChart, letter or phone within 4 business days after the clinic has received the results. If you do not hear from us within 7 days, please contact the clinic through MyChart or phone. If you have a critical or abnormal lab result, we will notify you by phone as soon as possible.  Submit refill requests through [employer] or call your pharmacy and they will forward the refill request to us. Please allow 3 business days for your refill to be completed.          Additional Information About Your Visit        Care EveryWhere ID     This is your Care EveryWhere ID. This could be used by other organizations to access your [place] medical records  [id number]        Your Vitals Were     Pulse Temperature Respirations BMI (Body Mass Index)          72 97.9  F (36.6  C) (Tympanic) 16 30.84 kg/m2         Blood Pressure from Last 3 Encounters:   11/29/18 124/82   08/28/18 120/80   08/21/18 124/69    Weight from Last 3 Encounters:   11/29/18 237 lb (107.5 kg)   08/28/18 249 lb 9.6 oz (113.2 kg)   08/21/18 240 lb (108.9 kg)              We Performed the Following     Strep, Rapid Screen          Today's Medication Changes          These changes are accurate as of 11/29/18  4:39 PM.  If you have any questions, ask your nurse or doctor.               Start taking these medicines.         Dose/Directions    amoxicillin 875 MG tablet   Commonly known as:  AMOXIL   Used for:  Streptococcal sore throat   Started by:  Ryley Cruz MD        Dose:  875 mg   Take 1 tablet (875 mg) by mouth 2 times daily   Quantity:  20 tablet   Refills:  0            Where to get your medicines      These medications were sent to [facility], - [place], [place] - [address]  [address], [place] [address]     Phone:  [phone]     amoxicillin 875 MG tablet                Primary Care Provider Office Phone # Fax #    Ryley VEGA [phone]       [address]  [place] [address]        Equal Access to Services     [facility]: Hadedwin Guerrero, wapeytonda akosuaadaha, qaybta kaalmada carlyachrissy, sajan cueto . So [facility] [phone].    ATENCIÓN: Si habla español, tiene a sevilla disposición servicios gratuitos de asistencia lingüística. Llame al [phone].    We comply with applicable federal civil rights laws and [place] laws. We do not discriminate on the basis of race, color, national origin, age, disability, sex, sexual orientation, or gender identity.            Thank you!     Thank you for choosing [facility] AND [facility]  for your care. Our goal is always to provide you with excellent care. Hearing back from our patients is one way we can continue to improve our services. Please take a few minutes to complete the written survey that you may receive in the mail after your visit with us. Thank you!             Your Updated Medication List - Protect others around you: Learn how to safely use, store and throw away your medicines at www.disposemymeds.org.          This list is accurate as of 11/29/18  4:39 PM.  Always use your most recent med list.                   Brand Name Dispense Instructions for use Diagnosis    amoxicillin 875 MG tablet    AMOXIL    20 tablet    Take 1 tablet (875 mg) by  mouth 2 times daily    Streptococcal sore throat

## 2018-11-29 NOTE — TELEPHONE ENCOUNTER
Patient is looking for a work in this afternoon, after 3 or so, for a sore throat and plugged ears.   Thank you

## 2018-11-29 NOTE — TELEPHONE ENCOUNTER
The patient was given an appointment today with Ryley Cruz MD.  Erica Altman LPN..................11/29/2018   12:53 PM

## 2018-11-29 NOTE — PROGRESS NOTES
"Nursing Notes:   Erica Atlman LPN  11/29/2018  3:24 PM  Signed  Chief Complaint   Patient presents with     Pharyngitis     for 4 days       Initial /82  Pulse 72  Temp 97.9  F (36.6  C) (Tympanic)  Resp 16  Wt 237 lb (107.5 kg)  BMI 30.84 kg/m2 Estimated body mass index is 30.84 kg/(m^2) as calculated from the following:    Height as of 3/9/16: 6' 1.5\" (1.867 m).    Weight as of this encounter: 237 lb (107.5 kg).  Medication Reconciliation: complete    Erica Altman LPN     The patient started having ear pain, now he has been having a sore throat for the past 4 days.   Erica Altman LPN..................11/29/2018   3:21 PM      SUBJECTIVE:  Mikel Lincoln  is a 38 year old male who comes in today because of sore throat for the last 4 days.  Prior to that had some ear pain.  He has been on the road.      Past Medical, Family, and Social History reviewed and updated as noted below.   ROS is negative except as noted above       No Known Allergies,   Family History   Problem Relation Age of Onset     Arthritis Father      Arthritis,RA     Arthritis Paternal Grandmother      Arthritis,RA     Family History Negative Mother      Good Health   ,   Current Outpatient Prescriptions   Medication     amoxicillin (AMOXIL) 875 MG tablet     No current facility-administered medications for this visit.    ,   Past Medical History:   Diagnosis Date     Personal history of other medical treatment (CODE)     No Comments Provided   ,   Patient Active Problem List    Diagnosis Date Noted     Athlete's foot 07/17/2014     Priority: Medium     Jock itch 07/17/2014     Priority: Medium     Light headed 07/17/2014     Priority: Medium     Fatigue 08/27/2013     Priority: Medium     Annual physical exam 12/21/2012     Priority: Medium   ,   Past Surgical History:   Procedure Laterality Date     APPENDECTOMY OPEN      No Comments Provided     TYMPANOSTOMY, LOCAL/TOPICAL ANESTHESIA      No Comments Provided    and "   Social History   Substance Use Topics     Smoking status: Current Some Day Smoker     Types: Cigarettes     Smokeless tobacco: Never Used     Alcohol use Yes      Comment: Social     OBJECTIVE:  /82  Pulse 72  Temp 97.9  F (36.6  C) (Tympanic)  Resp 16  Wt 237 lb (107.5 kg)  BMI 30.84 kg/m2   EXAM:  Alert and cooperative, no distress.  Nose is clear.  Throat is beefy red.  TMs are clear.  Neck is supple without significant adenopathy.  Lungs are clear, no rales rhonchi or wheezes are heard.  Cardiac RRR without murmur.    Results for orders placed or performed in visit on 11/29/18   Strep, Rapid Screen   Result Value Ref Range    Specimen Description Throat     Rapid Strep A Screen (A)      POSITIVE: Group A Streptococcal antigen detected by immunoassay.      ASSESSMENT/Plan :    Mikel was seen today for pharyngitis.    Diagnoses and all orders for this visit:    Streptococcal sore throat  -     Strep, Rapid Screen  -     amoxicillin (AMOXIL) 875 MG tablet; Take 1 tablet (875 mg) by mouth 2 times daily      Placed on Amoxil 875 mg twice daily times 10 days.  Discussed symptom medic treatment and contagion on.  Follow-up if worsening or not improving    Ryley Cruz MD

## 2019-02-28 ENCOUNTER — OFFICE VISIT (OUTPATIENT)
Dept: FAMILY MEDICINE | Facility: OTHER | Age: 39
End: 2019-02-28
Attending: NURSE PRACTITIONER
Payer: COMMERCIAL

## 2019-02-28 VITALS
HEIGHT: 73 IN | DIASTOLIC BLOOD PRESSURE: 80 MMHG | RESPIRATION RATE: 18 BRPM | SYSTOLIC BLOOD PRESSURE: 124 MMHG | OXYGEN SATURATION: 98 % | TEMPERATURE: 97.7 F | BODY MASS INDEX: 31.71 KG/M2 | HEART RATE: 60 BPM | WEIGHT: 239.25 LBS

## 2019-02-28 DIAGNOSIS — J06.9 VIRAL URI WITH COUGH: Primary | ICD-10-CM

## 2019-02-28 LAB
FLUAV+FLUBV RNA SPEC QL NAA+PROBE: NEGATIVE
FLUAV+FLUBV RNA SPEC QL NAA+PROBE: NEGATIVE
RSV RNA SPEC NAA+PROBE: NEGATIVE
SPECIMEN SOURCE: NORMAL

## 2019-02-28 PROCEDURE — 99213 OFFICE O/P EST LOW 20 MIN: CPT | Performed by: NURSE PRACTITIONER

## 2019-02-28 PROCEDURE — 87631 RESP VIRUS 3-5 TARGETS: CPT | Performed by: NURSE PRACTITIONER

## 2019-02-28 ASSESSMENT — PAIN SCALES - GENERAL: PAINLEVEL: NO PAIN (0)

## 2019-02-28 ASSESSMENT — MIFFLIN-ST. JEOR: SCORE: 2054.11

## 2019-02-28 NOTE — PROGRESS NOTES
SUBJECTIVE:   Mikel Lincoln is a 38 year old male who presents to clinic today for the following health issues:    Acute Illness   Acute illness concerns: flu like symptoms  Onset: Saturday/Sunday    Fever: YES- none for the last few days, was on Saturday/Sunday/Monday    Chills/Sweats: YES    Headache (location?): YES- occipital    Sinus Pressure:YES- tender, post-nasal drainage, teeth pain and mucopurulent discharge    Conjunctivitis:  YES: bilateral    Ear Pain: no    Rhinorrhea: YES    Congestion: YES    Sore Throat: no     Cough: YES-productive of yellow sputum    Wheeze: no    Decreased Appetite: no    Nausea: no    Vomiting: no    Diarrhea:  Yes a few days ago    Dysuria/Freq.: no    Fatigue/Achiness: YES    Sick/Strep Exposure: YES     Therapies Tried and outcome: nyquil, rest, mucinex, motrin    Problem list and histories reviewed & adjusted, as indicated.  Additional history: as documented    Patient Active Problem List   Diagnosis     Annual physical exam     Athlete's foot     Fatigue     Jock itch     Light headed     Past Surgical History:   Procedure Laterality Date     APPENDECTOMY OPEN      No Comments Provided     TYMPANOSTOMY, LOCAL/TOPICAL ANESTHESIA      No Comments Provided       Social History     Tobacco Use     Smoking status: Current Some Day Smoker     Types: Cigarettes     Smokeless tobacco: Never Used   Substance Use Topics     Alcohol use: Yes     Comment: Social     Family History   Problem Relation Age of Onset     Arthritis Father         Arthritis,RA     Arthritis Paternal Grandmother         Arthritis,RA     Family History Negative Mother         Good Health         No current outpatient medications on file.     No Known Allergies    Reviewed and updated as needed this visit by clinical staff  Tobacco  Allergies  Meds  Problems  Med Hx  Surg Hx  Fam Hx  Soc Hx        Reviewed and updated as needed this visit by Provider  Tobacco  Allergies  Meds  Problems  Med Hx   "Surg Hx  Fam Hx  Soc Hx          ROS:  As above    OBJECTIVE:     /80   Pulse 60   Temp 97.7  F (36.5  C) (Temporal)   Resp 18   Ht 1.854 m (6' 1\")   Wt 108.5 kg (239 lb 4 oz)   SpO2 98%   BMI 31.57 kg/m    Body mass index is 31.57 kg/m .  GENERAL: healthy, alert and no distress  EYES: Eyes grossly normal to inspection, PERRL and conjunctivae and sclerae normal  HENT: normal cephalic/atraumatic, both ears: erythematous, nasal mucosa edematous , rhinorrhea clear, oral mucous membranes moist, sinuses: not tender and cobblestoning of posterior oropharynx  NECK: no adenopathy, no asymmetry, masses, or scars and thyroid normal to palpation  RESP: lungs clear to auscultation - no rales, rhonchi or wheezes  CV: regular rate and rhythm, normal S1 S2, no S3 or S4, no murmur, click or rub, no peripheral edema and peripheral pulses strong  SKIN: no suspicious lesions or rashes  NEURO: Normal strength and tone, mentation intact and speech normal    Diagnostic Test Results:  Results for orders placed or performed in visit on 02/28/19 (from the past 24 hour(s))   Influenza A and B and RSV PCR   Result Value Ref Range    Specimen Description Nasopharyngeal     Influenza A PCR Negative NEG^Negative    Influenza B PCR Negative NEG^Negative    Resp Syncytial Virus Negative NEG^Negative       ASSESSMENT/PLAN:     1. Viral URI with cough  Influenza negative today -- Symptomatic treatments recommended.  -Discussed that antibiotics would not help symptoms of viral URI. Education provided on symptoms of secondary bacterial infection such as new fever, chills, rigors, shortness of breath, increased work of breathing, that can occur with viral URI and need for further evaluation, if they occur.   - Ensure you are staying hydrated by drinking plenty of fluids or eating foods such as popsicles, jello, pudding.  - Honey and Salt water gurgles can help soothe sore throat  - Rest  - Humidifier can help with congestion and help " keep mucus membranes such as throat and nose from drying out.  - Sleeping slightly propped up can help with congestion and postnasal drainage that can worsen cough at bedtime.  - As long as you have never been told to take Tylenol and/or Ibuprofen you can use them to manage fever and body aches per package instructions  Make sure you eat when you take ibuprofen to avoid stomach upset.  - OTC cough medications per package instructions to help with cough. Check to see if the cough/cold medication already has acetaminophen (Tylenol) in it. If it does avoid taking additional Tylenol.  - If sudden onset of new fever, worsening symptoms return for further evaluation.  - OTC antihistamine such as Allegra, Zyrtec, Claritin (generic is okay) can help with nasal/sinus congestion and OTC nasal steroid such as Flonase can help decrease sinus inflammation to help with congestion.    - Influenza A and B and RSV PCR      Alysia Romero NP  [facility] AND [facility]

## 2019-02-28 NOTE — NURSING NOTE
"Chief Complaint   Patient presents with     Flu Symptoms     started Saturday bodyaches, fever, diarrhea, poor appetite         Initial /80   Pulse 60   Temp 97.7  F (36.5  C) (Temporal)   Resp 18   Ht 1.854 m (6' 1\")   Wt 108.5 kg (239 lb 4 oz)   SpO2 98%   BMI 31.57 kg/m   Estimated body mass index is 31.57 kg/m  as calculated from the following:    Height as of this encounter: 1.854 m (6' 1\").    Weight as of this encounter: 108.5 kg (239 lb 4 oz).    Medication Reconciliation: complete      Norma J. Gosselin, LPN  "

## 2019-06-27 ENCOUNTER — HOSPITAL ENCOUNTER (OUTPATIENT)
Dept: ULTRASOUND IMAGING | Facility: OTHER | Age: 39
Discharge: HOME | End: 2019-06-27
Attending: FAMILY MEDICINE | Admitting: FAMILY MEDICINE
Payer: COMMERCIAL

## 2019-06-27 ENCOUNTER — OFFICE VISIT (OUTPATIENT)
Dept: FAMILY MEDICINE | Facility: OTHER | Age: 39
End: 2019-06-27
Attending: FAMILY MEDICINE
Payer: COMMERCIAL

## 2019-06-27 VITALS
RESPIRATION RATE: 16 BRPM | TEMPERATURE: 97.4 F | SYSTOLIC BLOOD PRESSURE: 140 MMHG | HEIGHT: 73 IN | BODY MASS INDEX: 32.63 KG/M2 | HEART RATE: 68 BPM | WEIGHT: 246.2 LBS | DIASTOLIC BLOOD PRESSURE: 98 MMHG

## 2019-06-27 DIAGNOSIS — N50.811 TESTICULAR PAIN, RIGHT: Primary | ICD-10-CM

## 2019-06-27 DIAGNOSIS — N50.811 TESTICULAR PAIN, RIGHT: ICD-10-CM

## 2019-06-27 PROCEDURE — 93976 VASCULAR STUDY: CPT

## 2019-06-27 PROCEDURE — 99213 OFFICE O/P EST LOW 20 MIN: CPT | Performed by: FAMILY MEDICINE

## 2019-06-27 RX ORDER — DOXYCYCLINE HYCLATE 100 MG
100 TABLET ORAL 2 TIMES DAILY
Qty: 14 TABLET | Refills: 0 | Status: SHIPPED | OUTPATIENT
Start: 2019-06-27 | End: 2020-01-09

## 2019-06-27 ASSESSMENT — PATIENT HEALTH QUESTIONNAIRE - PHQ9: SUM OF ALL RESPONSES TO PHQ QUESTIONS 1-9: 0

## 2019-06-27 ASSESSMENT — PAIN SCALES - GENERAL: PAINLEVEL: MODERATE PAIN (5)

## 2019-06-27 ASSESSMENT — MIFFLIN-ST. JEOR: SCORE: 2080.64

## 2019-06-27 NOTE — PROGRESS NOTES
"  SUBJECTIVE:   Mikel Lincoln is a 39 year old male who presents to clinic today for the following health issues: Testicle pain    Patient arrives here for testicle pain.  Associate with some nausea.  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  No fevers or chills.  Is been going on for 10 days but over 4 days is gotten worse.  Denies any urinary symptoms.  No STD risk exposure.        Patient Active Problem List    Diagnosis Date Noted     Athlete's foot 07/17/2014     Priority: Medium     Jock itch 07/17/2014     Priority: Medium     Past Medical History:   Diagnosis Date     Personal history of other medical treatment (CODE)     No Comments Provided      Past Surgical History:   Procedure Laterality Date     APPENDECTOMY OPEN      No Comments Provided     TYMPANOSTOMY, LOCAL/TOPICAL ANESTHESIA      No Comments Provided       Review of Systems     OBJECTIVE:     BP (!) 140/98   Pulse 68   Temp 97.4  F (36.3  C)   Resp 16   Ht 1.854 m (6' 1\")   Wt 111.7 kg (246 lb 3.2 oz)   BMI 32.48 kg/m    Body mass index is 32.48 kg/m .  Physical Exam   Constitutional: He appears well-developed.   Genitourinary:   Genitourinary Comments: Scrotum is contracted.  He does have pain on the right testicle but difficult to evaluate because of the contracted scrotum       Diagnostic Test Results:  No results found for this or any previous visit (from the past 24 hour(s)).  Results for orders placed or performed in visit on 02/28/19   Influenza A and B and RSV PCR   Result Value Ref Range    Specimen Description Nasopharyngeal     Influenza A PCR Negative NEG^Negative    Influenza B PCR Negative NEG^Negative    Resp Syncytial Virus Negative NEG^Negative       ASSESSMENT/PLAN:         1. Testicular pain, right  Ultrasound was unremarkable.  No evidence of torsion.  Possibly some mild epididymitis.  Will start Doxy.  If no improvement follow-up   - US Testicular & Scrotum w Doppler Ltd; Future  - doxycycline " hyclate (VIBRA-TABS) 100 MG tablet; Take 1 tablet (100 mg) by mouth 2 times daily  Dispense: 14 tablet; Refill: 0      Omar Sy MD  [facility] AND [facility]

## 2019-06-27 NOTE — NURSING NOTE
Patient here for right testicular pain for the past 10 days. Getting worse not better pain level is a 05/10. Medication Reconciliation: complete.    Kendra Dennis LPN  6/27/2019 11:32 AM

## 2019-11-07 ENCOUNTER — ALLIED HEALTH/NURSE VISIT (OUTPATIENT)
Dept: FAMILY MEDICINE | Facility: OTHER | Age: 39
End: 2019-11-07
Attending: FAMILY MEDICINE
Payer: COMMERCIAL

## 2019-11-07 DIAGNOSIS — Z23 NEED FOR PROPHYLACTIC VACCINATION AND INOCULATION AGAINST INFLUENZA: Primary | ICD-10-CM

## 2019-11-07 PROCEDURE — 90686 IIV4 VACC NO PRSV 0.5 ML IM: CPT

## 2019-11-07 PROCEDURE — 90471 IMMUNIZATION ADMIN: CPT

## 2020-01-09 ENCOUNTER — OFFICE VISIT (OUTPATIENT)
Dept: FAMILY MEDICINE | Facility: OTHER | Age: 40
End: 2020-01-09
Attending: CHIROPRACTOR
Payer: COMMERCIAL

## 2020-01-09 VITALS
RESPIRATION RATE: 16 BRPM | DIASTOLIC BLOOD PRESSURE: 78 MMHG | WEIGHT: 224.4 LBS | SYSTOLIC BLOOD PRESSURE: 120 MMHG | BODY MASS INDEX: 30.4 KG/M2 | TEMPERATURE: 97.9 F | HEART RATE: 75 BPM | OXYGEN SATURATION: 98 % | HEIGHT: 72 IN
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
## 2020-01-09 DIAGNOSIS — M54.2 DORSALGIA OF CERVICOTHORACIC REGION: ICD-10-CM

## 2020-01-09 DIAGNOSIS — M99.02 SEGMENTAL DYSFUNCTION OF THORACIC REGION: ICD-10-CM

## 2020-01-09 DIAGNOSIS — M99.01 SEGMENTAL DYSFUNCTION OF CERVICAL REGION: ICD-10-CM

## 2020-01-09 DIAGNOSIS — M54.6 DORSALGIA OF CERVICOTHORACIC REGION: ICD-10-CM

## 2020-01-09 DIAGNOSIS — M54.2 CERVICAL PAIN: ICD-10-CM

## 2020-01-09 DIAGNOSIS — G44.209 TENSION TYPE HEADACHE: ICD-10-CM

## 2020-01-09 PROCEDURE — 99215 OFFICE O/P EST HI 40 MIN: CPT | Performed by: CHIROPRACTOR

## 2020-01-09 ASSESSMENT — PAIN SCALES - GENERAL: PAINLEVEL: MODERATE PAIN (5)

## 2020-01-09 ASSESSMENT — MIFFLIN-ST. JEOR: SCORE: 1957.93
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
## 2020-02-06 ENCOUNTER — OFFICE VISIT (OUTPATIENT)
Dept: FAMILY MEDICINE | Facility: OTHER | Age: 40
End: 2020-02-06
Attending: CHIROPRACTOR
Payer: COMMERCIAL

## 2020-02-06 VITALS — BODY MASS INDEX: 27.63 KG/M2 | TEMPERATURE: 98.2 F | WEIGHT: 222.2 LBS | HEIGHT: 75 IN
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
## 2020-02-06 DIAGNOSIS — M99.02 SEGMENTAL DYSFUNCTION OF THORACIC REGION: ICD-10-CM

## 2020-02-06 DIAGNOSIS — M54.2 CERVICAL PAIN: ICD-10-CM

## 2020-02-06 DIAGNOSIS — M99.01 SEGMENTAL DYSFUNCTION OF CERVICAL REGION: ICD-10-CM

## 2020-02-06 PROCEDURE — 99213 OFFICE O/P EST LOW 20 MIN: CPT | Performed by: CHIROPRACTOR

## 2020-02-06 ASSESSMENT — MIFFLIN-ST. JEOR: SCORE: 1995.58

## 2020-02-06 ASSESSMENT — PAIN SCALES - GENERAL: PAINLEVEL: MILD PAIN (2)
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
## 2021-09-07 ENCOUNTER — HOSPITAL ENCOUNTER (OUTPATIENT)
Dept: GENERAL RADIOLOGY | Facility: OTHER | Age: 41
End: 2021-09-07
Attending: FAMILY MEDICINE
Payer: COMMERCIAL

## 2021-09-07 ENCOUNTER — OFFICE VISIT (OUTPATIENT)
Dept: FAMILY MEDICINE | Facility: OTHER | Age: 41
End: 2021-09-07
Attending: FAMILY MEDICINE
Payer: COMMERCIAL

## 2021-09-07 VITALS
HEIGHT: 73 IN | SYSTOLIC BLOOD PRESSURE: 120 MMHG | TEMPERATURE: 96.9 F | DIASTOLIC BLOOD PRESSURE: 80 MMHG | HEART RATE: 70 BPM | BODY MASS INDEX: 28.57 KG/M2 | WEIGHT: 215.6 LBS | RESPIRATION RATE: 16 BRPM | OXYGEN SATURATION: 97 %

## 2021-09-07 DIAGNOSIS — S69.92XA WRIST INJURY, LEFT, INITIAL ENCOUNTER: ICD-10-CM

## 2021-09-07 DIAGNOSIS — S69.92XA WRIST INJURY, LEFT, INITIAL ENCOUNTER: Primary | ICD-10-CM

## 2021-09-07 DIAGNOSIS — M67.431 GANGLION CYST OF WRIST, RIGHT: ICD-10-CM

## 2021-09-07 DIAGNOSIS — S80.01XA CONTUSION OF RIGHT KNEE, INITIAL ENCOUNTER: ICD-10-CM

## 2021-09-07 PROCEDURE — 73110 X-RAY EXAM OF WRIST: CPT | Mod: LT

## 2021-09-07 PROCEDURE — 73130 X-RAY EXAM OF HAND: CPT | Mod: LT

## 2021-09-07 PROCEDURE — 99214 OFFICE O/P EST MOD 30 MIN: CPT | Performed by: FAMILY MEDICINE

## 2021-09-07 ASSESSMENT — PAIN SCALES - GENERAL: PAINLEVEL: SEVERE PAIN (7)

## 2021-09-07 ASSESSMENT — MIFFLIN-ST. JEOR: SCORE: 1931.84
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
## 2021-09-07 NOTE — PROGRESS NOTES
"Nursing Notes:   Quin Meyer LPN  9/7/2021  9:54 AM  Signed  Patient presents to the clinic today to check for bilateral wrist pain.  Patient fell on his left wrist on Saturday, complains of wrist pain and swelling. Patient developed a mass on his right wrist, states no pain.  Quin Meyer LPN 9/7/2021   9:41 AM    Chief Complaint   Patient presents with     Trauma       Initial /80 (BP Location: Right arm, Patient Position: Sitting, Cuff Size: Adult Regular)   Pulse 70   Temp 96.9  F (36.1  C) (Tympanic)   Resp 16   Ht 1.854 m (6' 1\")   Wt 97.8 kg (215 lb 9.6 oz)   SpO2 97%   BMI 28.44 kg/m   Estimated body mass index is 28.44 kg/m  as calculated from the following:    Height as of this encounter: 1.854 m (6' 1\").    Weight as of this encounter: 97.8 kg (215 lb 9.6 oz).  Medication Reconciliation: complete  Quin Meyer LPN    FOOD SECURITY SCREENING QUESTIONS  Hunger Vital Signs:  Within the past 12 months we worried whether our food would run out before we got money to buy more. Never  Within the past 12 months the food we bought just didn't last and we didn't have money to get more. Never  Quin Meyer LPN 9/7/2021 9:45 AM         SUBJECTIVE:   CC:  Mikel Lincoln is a 41 year old male who presents to clinic today for the following health issues:  Wrist pain     HPI  Mikel Lincoln is a 41 year old male who presents for evaluation of wrist injuty - left  He is right handed.  He had fallen playing hockey at home September 4.  He was able to finish playing.  He also hit/bruised his right knee at that same time.  His wrist, in particular laterally, has continued to be sore.    He also has a lump/bump on his right wrist.  This is been present for a longer period of time.  He states his mother-in-law told him it was a cyst.  Is not sore, not tender to touch.     No Known Allergies  No current outpatient medications on file.     No current facility-administered medications for this " "visit.      Past Medical History:   Diagnosis Date     Personal history of other medical treatment (CODE)     No Comments Provided      Past Surgical History:   Procedure Laterality Date     APPENDECTOMY OPEN      No Comments Provided     TYMPANOSTOMY, LOCAL/TOPICAL ANESTHESIA      No Comments Provided       Review of Systems     PHQ-2 Score:     PHQ-2 ( 1999 [employer]) 9/7/2021 1/9/2020   Q1: Little interest or pleasure in doing things 0 0   Q2: Feeling down, depressed or hopeless 0 0   PHQ-2 Score 0 0         PHQ-9 SCORE 3/9/2016 6/27/2019   PHQ-9 Total Score 3 0     JEANETTE-7 SCORE 3/9/2016   Total Score 11             OBJECTIVE:     /80 (BP Location: Right arm, Patient Position: Sitting, Cuff Size: Adult Regular)   Pulse 70   Temp 96.9  F (36.1  C) (Tympanic)   Resp 16   Ht 1.854 m (6' 1\")   Wt 97.8 kg (215 lb 9.6 oz)   SpO2 97%   BMI 28.44 kg/m    Wt Readings from Last 3 Encounters:   09/07/21 97.8 kg (215 lb 9.6 oz)   02/06/20 100.8 kg (222 lb 3.2 oz)   01/09/20 101.8 kg (224 lb 6.4 oz)       Nursing notes and VS reviewed.      Body mass index is 28.44 kg/m .  Physical Exam  Constitutional:       Appearance: Normal appearance.   Musculoskeletal:      Comments: Left wrist with mild swelling, he has snuffbox tenderness and tenderness at distal radius.  Mild decreased range of motion.  No erythema.  Normal range of motion of his fingers.    Right wrist with 2 cm raised soft mass.  This is nontender.    Right knee: Normal range of motion, he does have some inferior lateral tenderness.   Neurological:      Mental Status: He is alert.          Results for orders placed or performed during the hospital encounter of 09/07/21   XR Hand Left G/E 3 Views     Status: None    Narrative    PROCEDURE:  XR HAND LT G/E 3 VW    HISTORY: Wrist injury, left, initial encounter    COMPARISON:  None.    TECHNIQUE:  3 views of the left hand were obtained.    FINDINGS:  No fracture or dislocation is identified. The joint " spaces  are preserved.        Impression    IMPRESSION: Normal left hand      DESI GRANGER MD         SYSTEM ID:  [id number]   Results for orders placed or performed during the hospital encounter of 09/07/21   XR Wrist Left G/E 3 Views     Status: None    Narrative    PROCEDURE:  XR WRIST LEFT G/E 3 VIEWS    HISTORY: Wrist injury, left, initial encounter    COMPARISON:  None.    TECHNIQUE:  4 views of the left wrist were obtained.    FINDINGS:  No fracture or dislocation is identified. The joint spaces  are preserved.        Impression    IMPRESSION: No acute fracture.      DESI GRANGER MD         SYSTEM ID:  [id number]         ASSESSMENT/PLAN:     1. Wrist injury, left, initial encounter    2. Contusion of right knee, initial encounter    3. Ganglion cyst of wrist, right        Assessment & Plan   Problem List Items Addressed This Visit     None      Visit Diagnoses     Wrist injury, left, initial encounter    -  Primary    Relevant Orders    XR Wrist Left G/E 3 Views (Completed)    XR Hand Left G/E 3 Views (Completed)    THUMB SPICA SPLINT (Completed)    Contusion of right knee, initial encounter        Ganglion cyst of wrist, right              Review of the result(s) of each unique test - I have personally reviewed the imaging test listed above.    1.  With mechanism of injury and symptoms, there is an increase suspicion for scaphoid fracture.  This is not seen on x-rays today.  Because of this concern though, he will be placed in a thumb spica splint.  Recommend icing, over-the-counter analgesics for wrist pain and knee pain.  2.  Discussed that lump on right wrist is most likely ganglion cyst.  At some point in time, he should consider orthopedic/surgical consultation for elective treatment.    Follow-up appointment next week recommended.        AYUSH TOLEDO MD  [facility]    PDMP Review     None

## 2021-09-08 ENCOUNTER — TELEPHONE (OUTPATIENT)
Dept: FAMILY MEDICINE | Facility: OTHER | Age: 41
End: 2021-09-08

## 2021-09-08 NOTE — TELEPHONE ENCOUNTER
Note is not done so I am not sure what the recommendations were.  Ryley Cruz MD on 9/8/2021 at 5:23 PM

## 2021-09-08 NOTE — TELEPHONE ENCOUNTER
Patient stating he needs a follow up x-ray, and orders need to be placed. Looks as if he had an appointment yesterday with J and also received imaging then.    Destini Camargo LPN on 9/8/2021 at 4:22 PM

## 2021-09-09 NOTE — TELEPHONE ENCOUNTER
The patient called and stated he needs to get x-rays done.  He can do this on Monday.  Please advise.  He is over the road and wants to make sure he can get an x-ray to see what should be done.

## 2021-09-10 NOTE — TELEPHONE ENCOUNTER
Patient has been trying to call to make a follow up visit with PCJ since 9/7/21, and keeps getting transferred to radiology to schedule an xray. Pt needs the appt on 9/13, as this is the only day he can do since he is on  and will be leaving in the afternoon. No provider in clinic has openings this day, and pt states this date was discussed with PCJ in the visit on 9/7 and he would be worked in.  Please contact the patient with an appt date/time or call Neeta in Radiology scheduling and the message will be relayed to the patient.    Pt's phone number is: [phone]    Neeta Pompa on 9/10/2021 at 11:29 AM

## 2021-09-10 NOTE — TELEPHONE ENCOUNTER
He is to follow up next week on his wrist - if still sore will likely need a follow up x-ray.  Willow Diggs MD

## 2021-09-10 NOTE — TELEPHONE ENCOUNTER
9/13/2021 9:20 AM Willow Riddle MD     Patient is already scheduled for a follow up with PCJ. Left message with appt day and time.   Ceci Ahumada LPN ..........9/10/2021 3:12 PM

## 2021-09-12 NOTE — PROGRESS NOTES
Nursing Notes:   Herminia Alberto LPN  9/13/2021  9:48 AM  Signed  Patient is here to follow up on left wrist.     Medication Reconciliation: complete    Herminia Alberto LPN  9/13/2021 9:33 AM    FOOD SECURITY SCREENING QUESTIONS  Hunger Vital Signs:  Within the past 12 months we worried whether our food would run out before we got money to buy more. Never  Within the past 12 months the food we bought just didn't last and we didn't have money to get more. Never  Herminia Alberto LPN 9/13/2021 9:33 AM         SUBJECTIVE:   CC:  Mikel Lincoln is a 41 year old male who presents to clinic today for the following health issues:  Follow up of left wrist injury.     HPI  Mikel Lincoln is a 41 year old male who presents for follow up of left wrist injury.  He was seen for this last week.  X-rays including looking for scaphoid fracture, obtained and were negative.  He states he has been consistent with wearing his splint.  There is still some soreness, tenderness.     No Known Allergies  No current outpatient medications on file.     No current facility-administered medications for this visit.          Review of Systems     PHQ-2 Score:     PHQ-2 ( 1999 Pfizer) 9/13/2021 9/7/2021   Q1: Little interest or pleasure in doing things 0 0   Q2: Feeling down, depressed or hopeless 0 0   PHQ-2 Score 0 0         PHQ-9 SCORE 3/9/2016 6/27/2019   PHQ-9 Total Score 3 0     JEANETTE-7 SCORE 3/9/2016   Total Score 11             OBJECTIVE:     /74 (BP Location: Right arm, Patient Position: Sitting, Cuff Size: Adult Large)   Pulse 79   Temp 97.2  F (36.2  C) (Tympanic)   Resp 16   Wt 98 kg (216 lb)   SpO2 98%   BMI 28.50 kg/m    Wt Readings from Last 3 Encounters:   09/13/21 98 kg (216 lb)   09/07/21 97.8 kg (215 lb 9.6 oz)   02/06/20 100.8 kg (222 lb 3.2 oz)       Nursing notes and VS reviewed.      Body mass index is 28.5 kg/m .  Physical Exam  Constitutional:       Appearance: Normal appearance.   Musculoskeletal:       Comments: Tenderness base of left thumb, also over proximal mid wrist.  He is not tender over the radius or ulna.  Resolving bruising is noted.   Neurological:      Mental Status: He is alert.          Results for orders placed or performed during the hospital encounter of 09/13/21   CT Wrist Left w/o Contrast     Status: None    Narrative    Exam: CT WRIST LEFT W/O CONTRAST    Exam reason: Fracture, wrist; Wrist injury, left, initial encounter;  Injury of wrist, left, subsequent encounter    Technique: Using helical CT technique, axial images are obtained  through the left wrist. Coronal and sagittal reformats were performed.  No intravenous contrast was utilized.    Comparison: Left wrist radiographs on 9/7/2012    Findings:    There is an essentially nondisplaced fracture of the ridge of the  trapezium (series 3 image 37 and series 5 image 10).    No other fracture or dislocation is demonstrated.    Alignment is anatomic.     The joint spaces are preserved.     There is mild soft tissue edema of the volar wrist.      Impression    Impression:    Essentially nondisplaced fracture of the ridge of the trapezium.    KENTON FRASER MD         SYSTEM ID:  [id number]         ASSESSMENT/PLAN:     1. Wrist injury, left, initial encounter    2. Injury of wrist, left, subsequent encounter    3. Closed nondisplaced fracture of trapezium of left wrist with routine healing, subsequent encounter        Assessment & Plan   Problem List Items Addressed This Visit     None      Visit Diagnoses     Wrist injury, left, initial encounter    -  Primary    Relevant Orders    CT Wrist Left w/o Contrast (Completed)    Injury of wrist, left, subsequent encounter        Relevant Orders    CT Wrist Left w/o Contrast (Completed)    Closed nondisplaced fracture of trapezium of left wrist with routine healing, subsequent encounter        Relevant Orders    Orthopedic  Referral          I have personally reviewed the imaging test listed  above.  I have also reviewed the images with patient.    Patient will be placed in a thumb spica cast.  Edema/swelling has resolved enough to have a cast placed today.  Recommendation is for follow-up in 2 to 3 weeks time with sports medicine/orthopedics.      AYUSH TOLEDO MD  [facility]    PDMP Review     None

## 2021-09-13 ENCOUNTER — OFFICE VISIT (OUTPATIENT)
Dept: FAMILY MEDICINE | Facility: OTHER | Age: 41
End: 2021-09-13
Attending: FAMILY MEDICINE
Payer: COMMERCIAL

## 2021-09-13 ENCOUNTER — HOSPITAL ENCOUNTER (OUTPATIENT)
Dept: CT IMAGING | Facility: OTHER | Age: 41
End: 2021-09-13
Attending: FAMILY MEDICINE
Payer: COMMERCIAL

## 2021-09-13 VITALS
RESPIRATION RATE: 16 BRPM | HEART RATE: 79 BPM | DIASTOLIC BLOOD PRESSURE: 74 MMHG | WEIGHT: 216 LBS | OXYGEN SATURATION: 98 % | TEMPERATURE: 97.2 F | BODY MASS INDEX: 28.5 KG/M2 | SYSTOLIC BLOOD PRESSURE: 128 MMHG

## 2021-09-13 DIAGNOSIS — S62.175D CLOSED NONDISPLACED FRACTURE OF TRAPEZIUM OF LEFT WRIST WITH ROUTINE HEALING, SUBSEQUENT ENCOUNTER: ICD-10-CM

## 2021-09-13 DIAGNOSIS — S69.92XA WRIST INJURY, LEFT, INITIAL ENCOUNTER: Primary | ICD-10-CM

## 2021-09-13 DIAGNOSIS — S69.92XD: ICD-10-CM

## 2021-09-13 DIAGNOSIS — S69.92XA WRIST INJURY, LEFT, INITIAL ENCOUNTER: ICD-10-CM

## 2021-09-13 PROCEDURE — 99213 OFFICE O/P EST LOW 20 MIN: CPT | Performed by: FAMILY MEDICINE

## 2021-09-13 PROCEDURE — 73200 CT UPPER EXTREMITY W/O DYE: CPT | Mod: LT

## 2021-09-13 ASSESSMENT — PAIN SCALES - GENERAL: PAINLEVEL: MODERATE PAIN (5)

## 2021-09-13 NOTE — NURSING NOTE
Patient is here to follow up on left wrist.     Medication Reconciliation: complete    Herminia Alberto LPN  9/13/2021 9:33 AM    FOOD SECURITY SCREENING QUESTIONS  Hunger Vital Signs:  Within the past 12 months we worried whether our food would run out before we got money to buy more. Never  Within the past 12 months the food we bought just didn't last and we didn't have money to get more. Never  Herminia Alberto LPN 9/13/2021 9:33 AM

## 2021-09-13 NOTE — PATIENT INSTRUCTIONS
Patient Education     Fiberglass Cast Care    It may take up to 2 hours for the fiberglass to get completely hard. Don t put any weight on the cast during that time or it may break.   To prevent swelling under the cast, do the following for the first 2 days (48 hours):    If the cast is on your arm:  Keep it in a sling or raised to shoulder level when you are sitting or standing. Rest it on your chest or on a pillow at your side when lying down. Keep the cast above the level of your heart.    If the cast is on your leg:  Keep it propped up above the level of your hip when you are sitting or lying down. Sleep with the cast raised on pillows. Avoid crutch walking as much as possible during this time.  Keep the cast completely dry at all times. Bathe with your cast well out of the water. Protect it with a large plastic bag kept in place with rubber bands or tape. If your cast does get wet, use a hair dryer to warm the cast and speed up the drying process. A wet cast may cause skin problems.   Don t put creams or objects under the cast if you have itching. If itching persists, contact your provider.   Follow-up care  Follow up with your healthcare provider as advised.   When to get medical advice  Call your healthcare provider right away if any of these occur:    The cast cracks    Soft or weak spot in the cast    The cast and padding get wet and stay wet for more than 24 hours    Bad smell from the cast or wound fluid stains the cast    Tightness or pressure under the cast gets worse    Fingers or toes become swollen, cold, blue, numb, or tingly    You can t move your toes or fingers    Pain under the cast gets worse or you feel a burning sensation    Skin around the cast becomes red or irritated    Fever of 100.4 F (38 C) or higher, or as directed by your healthcare provider     Shaking chills  [employer] last reviewed this educational content on 4/1/2020 2000-2021 [employer]. All rights reserved. This  information is not intended as a substitute for professional medical care. Always follow your healthcare professional's instructions.

## 2021-09-27 ENCOUNTER — OFFICE VISIT (OUTPATIENT)
Dept: FAMILY MEDICINE | Facility: OTHER | Age: 41
End: 2021-09-27
Attending: FAMILY MEDICINE
Payer: COMMERCIAL

## 2021-09-27 ENCOUNTER — HOSPITAL ENCOUNTER (OUTPATIENT)
Dept: GENERAL RADIOLOGY | Facility: OTHER | Age: 41
End: 2021-09-27
Attending: FAMILY MEDICINE
Payer: COMMERCIAL

## 2021-09-27 VITALS
OXYGEN SATURATION: 97 % | HEART RATE: 81 BPM | HEIGHT: 73 IN | WEIGHT: 214.6 LBS | DIASTOLIC BLOOD PRESSURE: 70 MMHG | TEMPERATURE: 97.4 F | RESPIRATION RATE: 16 BRPM | BODY MASS INDEX: 28.44 KG/M2 | SYSTOLIC BLOOD PRESSURE: 110 MMHG

## 2021-09-27 DIAGNOSIS — S62.175A CLOSED NONDISPLACED FRACTURE OF TRAPEZIUM OF LEFT WRIST, INITIAL ENCOUNTER: Primary | ICD-10-CM

## 2021-09-27 PROCEDURE — 73110 X-RAY EXAM OF WRIST: CPT | Mod: LT

## 2021-09-27 PROCEDURE — 25630 CLTX CARPL FX W/O MNPJ EA B1: CPT | Performed by: FAMILY MEDICINE

## 2021-09-27 ASSESSMENT — PAIN SCALES - GENERAL: PAINLEVEL: NO PAIN (1)

## 2021-09-27 ASSESSMENT — MIFFLIN-ST. JEOR: SCORE: 1923.33

## 2021-09-27 NOTE — NURSING NOTE
"Chief Complaint   Patient presents with     Fracture     trapezium left wrist, pain 1/10, DOI: 9/4/21, injured from a fall while playing hockey     Patient presents to clinic today for left wrist trapezium fracture. Pain 1/10. DOI: 9/4/21. Injured from a fall while playing hockey. He was seen by Dr. Christian Artis on 9/13/21 where he was placed in a thumb spica cast that he has on at appointment today.     Initial /70 (BP Location: Right arm, Patient Position: Sitting, Cuff Size: Adult Large)   Pulse 81   Temp 97.4  F (36.3  C) (Tympanic)   Resp 16   Ht 1.848 m (6' 0.75\")   Wt 97.3 kg (214 lb 9.6 oz)   SpO2 97%   BMI 28.51 kg/m   Estimated body mass index is 28.51 kg/m  as calculated from the following:    Height as of this encounter: 1.848 m (6' 0.75\").    Weight as of this encounter: 97.3 kg (214 lb 9.6 oz).     FOOD SECURITY SCREENING QUESTIONS  Hunger Vital Signs:  Within the past 12 months we worried whether our food would run out before we got money to buy more. Never  Within the past 12 months the food we bought just didn't last and we didn't have money to get more. Never      Medication Reconciliation: Complete      Nissa Zepeda LPN   "

## 2021-09-27 NOTE — PROGRESS NOTES
"HPI:  41-year-old male coming in for evaluation of a left wrist injury that occurred while playing hockey with his sons on .  Patient fell forward landing on his left hand/wrist.  He had immediate pain.  He was seen in clinic on .  X-rays were performed and were found to be negative.  Because of the concern of fracture a CT was ordered which showed a chip fracture to the trapezium.  Patient was placed in a short arm thumb spica cast.  He is here today for follow-up.  He is endorsing 1/10 pain.  He characterizes the pain as dull.  He has difficulty with bending and lifting.  He does report that he got his cast wet.  No new injuries.      EXAM:  /70 (BP Location: Right arm, Patient Position: Sitting, Cuff Size: Adult Large)   Pulse 81   Temp 97.4  F (36.3  C) (Tympanic)   Resp 16   Ht 1.848 m (6' 0.75\")   Wt 97.3 kg (214 lb 9.6 oz)   SpO2 97%   BMI 28.51 kg/m    MUSCULOSKELETAL EXAM:  LEFT WRIST  Inspection:  -No gross deformity  -No bruising or swelling  -Scars:  None    Tenderness to palpation of the:  -Medial epicondyle:  Negative  -Lateral epicondyle:  Negative  -Radial head:  Negative  -Radial shaft:  Negative  -Ulnar shaft:  Negative  -Forearm flexors and extensors:  Negative  -Ulnar styloid:  Negative  -Distal radius:  Negative  -Azeem's tubercle:  Negative  -Scapholunate ligament:  Negative  -Scaphoid in anatomical snuffbox:  Negative  -CMC joint: Positive volarly  -1st MCP joint:  Negative  -Metacarpals:  Negative  -Thenar eminence: Mild pain  -Hypothenar eminence:  Negative    Sensation:  -Intact to light touch in the radial, median, and ulnar nerve distributions    Motor:  -Intact AIN, PIN, and IO    Other:  -No signs of cyanosis. Normal skin temperature of the upper extremity.  -Elbow:  No gross deformity. Full range of motion.  -Right wrist:  No gross deformity. No palpable tenderness. Normal strength and ROM.      IMAGIN2021: 3 view left hand x-ray  -No fracture, dislocation, " or bony lesion    9/13/2021: CT left wrist  -There is a small chip fracture to the trapezium.  No other fracture, dislocation.    9/27/2021: 4 view left wrist x-ray  -Previously seen chip fracture to the trapezium is not well visualized.  No abnormal bony alignment.      ASSESSMENT/PLAN:  Diagnoses and all orders for this visit:  Closed nondisplaced fracture of trapezium of left wrist, initial encounter  -     XR Wrist Left G/E 3 Views  -     Orthopedic  Referral  -     CLOSED TX CARPAL FX W/O MANIP, EACH    41-year-old male with a chip fracture to the trapezium.  Imaging from 9/7, 9/13, and 9/27 were all personally reviewed in the office with the findings as demonstrated above by my interpretation.  This is an injury that can be treated nonoperatively.  He will require several more weeks of immobilization to demonstrate clinical healing.  -Continue in short arm thumb spica cast  -Follow-up in 3 weeks for repeat x-rays out of the cast  -Likely transition away from casting at next visit    Global fracture code billing (CPT:  09912)       Naren Alexandra MD  9/27/2021  11:11 AM    Total time spent with this patient was 28 minutes which included chart review, visualization and interpretation of images, time spent with the patient, and documentation.

## 2021-10-14 ENCOUNTER — HOSPITAL ENCOUNTER (OUTPATIENT)
Dept: GENERAL RADIOLOGY | Facility: OTHER | Age: 41
End: 2021-10-14
Attending: FAMILY MEDICINE
Payer: COMMERCIAL

## 2021-10-14 ENCOUNTER — OFFICE VISIT (OUTPATIENT)
Dept: FAMILY MEDICINE | Facility: OTHER | Age: 41
End: 2021-10-14
Attending: FAMILY MEDICINE
Payer: COMMERCIAL

## 2021-10-14 VITALS
DIASTOLIC BLOOD PRESSURE: 86 MMHG | TEMPERATURE: 97.7 F | SYSTOLIC BLOOD PRESSURE: 118 MMHG | BODY MASS INDEX: 28.27 KG/M2 | OXYGEN SATURATION: 98 % | HEART RATE: 64 BPM | WEIGHT: 212.8 LBS | RESPIRATION RATE: 16 BRPM

## 2021-10-14 DIAGNOSIS — S62.175D CLOSED NONDISPLACED FRACTURE OF TRAPEZIUM OF LEFT WRIST WITH ROUTINE HEALING, SUBSEQUENT ENCOUNTER: Primary | ICD-10-CM

## 2021-10-14 PROCEDURE — 99207 PR FRACTURE CARE IN GLOBAL PERIOD: CPT | Performed by: FAMILY MEDICINE

## 2021-10-14 PROCEDURE — 73110 X-RAY EXAM OF WRIST: CPT | Mod: LT

## 2021-10-14 ASSESSMENT — PAIN SCALES - GENERAL: PAINLEVEL: NO PAIN (0)

## 2021-10-14 NOTE — NURSING NOTE
"Chief Complaint   Patient presents with     Follow Up     trapezium fracture left wrist, pain 0/10, 90% improvement     Patient presents to clinic today for follow up trapezium fracture left wrist. Pain 0/10. He reports 90% improvement.     Initial /86 (BP Location: Right arm, Patient Position: Sitting, Cuff Size: Adult Regular)   Pulse 64   Temp 97.7  F (36.5  C) (Tympanic)   Resp 16   Wt 96.5 kg (212 lb 12.8 oz)   SpO2 98%   BMI 28.27 kg/m   Estimated body mass index is 28.27 kg/m  as calculated from the following:    Height as of 9/27/21: 1.848 m (6' 0.75\").    Weight as of this encounter: 96.5 kg (212 lb 12.8 oz).     FOOD SECURITY SCREENING QUESTIONS  Hunger Vital Signs:  Within the past 12 months we worried whether our food would run out before we got money to buy more. Never  Within the past 12 months the food we bought just didn't last and we didn't have money to get more. Never      Medication Reconciliation: Complete      Nissa Zepeda LPN   "

## 2021-10-14 NOTE — PROGRESS NOTES
HPI:  41-year-old male coming in for follow-up evaluation of a left trapezium fracture that occurred while playing hockey on .  He fell forward landing on his left hand/wrist.  He was seen in clinic on  with follow-up visit on .  CT revealed a chip fracture to the trapezium.  He was casted and seen in this office on .  He continues in the short arm thumb spica cast.  No new injuries.  No problems with the cast.  He is not endorsing any pain.      EXAM:  /86 (BP Location: Right arm, Patient Position: Sitting, Cuff Size: Adult Regular)   Pulse 64   Temp 97.7  F (36.5  C) (Tympanic)   Resp 16   Wt 96.5 kg (212 lb 12.8 oz)   SpO2 98%   BMI 28.27 kg/m    MUSCULOSKELETAL EXAM:  LEFT WRIST  Inspection:  -No gross deformity  -No bruising or swelling  -Scars:  None    Tenderness to palpation of the:  -Medial epicondyle:  Negative  -Lateral epicondyle:  Negative  -Radial head:  Negative  -Radial shaft:  Negative  -Ulnar shaft:  Negative  -Forearm flexors and extensors:  Negative  -Ulnar styloid:  Negative  -Distal radius:  Negative  -Azeem's tubercle:  Negative  -Scapholunate ligament:  Negative  -Scaphoid in anatomical snuffbox:  Negative  -1st CMC joint:  Negative  -1st MCP joint:  Negative  -Metacarpals:  Negative    Sensation:  -Intact to light touch in the radial, median, and ulnar nerve distributions    Motor:  -Intact AIN, PIN, and IO    Other:  -No signs of cyanosis. Normal skin temperature of the upper extremity.  -Elbow:  No gross deformity. Full range of motion.  -Right wrist:  No gross deformity. No palpable tenderness. Normal strength and ROM.      IMAGIN2021: 3 view left hand x-ray  -No fracture, dislocation, or bony lesion     2021: CT left wrist  -There is a small chip fracture to the trapezium.  No other fracture, dislocation.     2021: 4 view left wrist x-ray  -Previously seen chip fracture to the trapezium is not well visualized.  No abnormal bony  alignment.    10/14/2021: 3 view left wrist x-ray  -Chip fracture to the trapezium is not well visualized.  Normal bony alignment.      ASSESSMENT/PLAN:  Diagnoses and all orders for this visit:  Closed nondisplaced fracture of trapezium of left wrist with routine healing, subsequent encounter  -     XR Wrist Left G/E 3 Views    41-year-old male with a chip fracture to the left trapezium.  Imaging from 9/7, 9/13, 9/27, and 10/14 were all personally viewed in the office with the findings as demonstrated above by my interpretation.  Patient is demonstrating good clinical and radiographic evidence of healing.  At this time I feel it is reasonable to transition away from cast immobilization.  -Cast removed in the office today  -Transition back into normal activities over the next 2-3 weeks  -Okay to use wrist brace as needed for comfort in situations where the supporting musculature may fatigue easier  -Follow-up as needed    Global fracture code billing (CPT:  36694)       Naren Alexandra MD  10/14/2021  1:25 PM    Total time spent with this patient was 21 minutes which included chart review, visualization and interpretation of images, time spent with the patient, and documentation.

## 2021-11-14 ENCOUNTER — ALLIED HEALTH/NURSE VISIT (OUTPATIENT)
Dept: FAMILY MEDICINE | Facility: OTHER | Age: 41
End: 2021-11-14
Attending: FAMILY MEDICINE
Payer: COMMERCIAL

## 2021-11-14 DIAGNOSIS — Z20.822 COVID-19 RULED OUT: Primary | ICD-10-CM

## 2021-11-14 DIAGNOSIS — Z71.84 TRAVEL ADVICE ENCOUNTER: ICD-10-CM

## 2021-11-14 PROCEDURE — C9803 HOPD COVID-19 SPEC COLLECT: HCPCS

## 2021-11-14 PROCEDURE — U0003 INFECTIOUS AGENT DETECTION BY NUCLEIC ACID (DNA OR RNA); SEVERE ACUTE RESPIRATORY SYNDROME CORONAVIRUS 2 (SARS-COV-2) (CORONAVIRUS DISEASE [COVID-19]), AMPLIFIED PROBE TECHNIQUE, MAKING USE OF HIGH THROUGHPUT TECHNOLOGIES AS DESCRIBED BY CMS-2020-01-R: HCPCS | Mod: ZL

## 2021-11-15 LAB — SARS-COV-2 RNA RESP QL NAA+PROBE: NEGATIVE

## 2021-12-04 ENCOUNTER — HEALTH MAINTENANCE LETTER (OUTPATIENT)
Age: 41
End: 2021-12-04

## 2022-09-17 ENCOUNTER — HEALTH MAINTENANCE LETTER (OUTPATIENT)
Age: 42
End: 2022-09-17

## 2022-11-23 ENCOUNTER — OFFICE VISIT (OUTPATIENT)
Dept: FAMILY MEDICINE | Facility: OTHER | Age: 42
End: 2022-11-23
Attending: NURSE PRACTITIONER
Payer: COMMERCIAL

## 2022-11-23 VITALS
BODY MASS INDEX: 29.16 KG/M2 | HEIGHT: 73 IN | DIASTOLIC BLOOD PRESSURE: 78 MMHG | WEIGHT: 220 LBS | OXYGEN SATURATION: 98 % | TEMPERATURE: 97.9 F | SYSTOLIC BLOOD PRESSURE: 112 MMHG | HEART RATE: 72 BPM | RESPIRATION RATE: 16 BRPM

## 2022-11-23 DIAGNOSIS — R07.0 THROAT PAIN: ICD-10-CM

## 2022-11-23 DIAGNOSIS — J02.0 STREPTOCOCCAL PHARYNGITIS: Primary | ICD-10-CM

## 2022-11-23 PROCEDURE — 99213 OFFICE O/P EST LOW 20 MIN: CPT | Performed by: NURSE PRACTITIONER

## 2022-11-23 RX ORDER — IBUPROFEN 200 MG
200 TABLET ORAL EVERY 4 HOURS PRN
COMMUNITY

## 2022-11-23 RX ORDER — PENICILLIN V POTASSIUM 500 MG/1
500 TABLET, FILM COATED ORAL 2 TIMES DAILY
Qty: 20 TABLET | Refills: 0 | Status: SHIPPED | OUTPATIENT
Start: 2022-11-23 | End: 2022-12-03

## 2022-11-23 ASSESSMENT — PAIN SCALES - GENERAL: PAINLEVEL: SEVERE PAIN (7)

## 2022-11-23 NOTE — NURSING NOTE
Chief Complaint   Patient presents with     Throat Problem     X 1 day     Tx with ibuprofen.  Pain when swallowing 7/10      Advanced Care Planning on file?     Medication Review Completed: complete    FOOD SECURITY SCREENING QUESTIONS:    The next two questions are to help us understand your food security.  If you are feeling you need any assistance in this area, we have resources available to support you today.    Hunger Vital Signs:  Within the past 12 months we worried whether our food would run out before we got money to buy more. Never  Within the past 12 months the food we bought just didn't last and we didn't have money to get more. Never    Noelle Lyle LPN

## 2022-11-23 NOTE — PROGRESS NOTES
ASSESSMENT/PLAN:     I have reviewed the nursing notes.  I have reviewed the findings, diagnosis, plan and need for follow up with the patient.      1. Throat pain    Ibuprofen every 6 hours as needed for pain and swelling    Symptomatic treatment - Encouraged fluids, salt water gargles, honey, elevation, humidifier, lozenges, tea, soup, smoothies, popsicles, etc     2. Streptococcal pharyngitis    - penicillin V (VEETID) 500 MG tablet; Take 1 tablet (500 mg) by mouth 2 times daily for 10 days  Dispense: 20 tablet; Refill: 0    Symptoms and exam consistent with strep with known outbreak in community, patient request no testing today.    New toothbrush after second day of antibiotics    Discussed warning signs/symptoms indicative of need to f/u  Follow up if symptoms persist or worsen or concerns      I explained my diagnostic considerations and recommendations to the patient, who voiced understanding and agreement with the treatment plan. All questions were answered. We discussed potential side effects of any prescribed or recommended therapies, as well as expectations for response to treatments.    No name on file  [facility]      SUBJECTIVE:   Mikel Lincoln is a 42 year old male who presents to clinic today for the following health issues:  Strep throat    HPI  Sore throat, fatigue, and chills  started last evening.  Woke up this morning with severe throat pain.  Painful to swallow even water. Throat pain radiates to ears.   Sweats this morning.  Mild headache, resolved with Ibuprofen.   No cough or shortness of breath.  No nasal drainage or congestion.    Took Ibuprofen with some relief.        Past Medical History:   Diagnosis Date     Personal history of other medical treatment (CODE)     No Comments Provided     Past Surgical History:   Procedure Laterality Date     APPENDECTOMY OPEN      No Comments Provided     TYMPANOSTOMY, LOCAL/TOPICAL ANESTHESIA      No Comments Provided     Social  "History     Tobacco Use     Smoking status: Some Days     Types: Cigarettes     Smokeless tobacco: Never     Tobacco comments:     Occasional- 1 pack in 2 weeks   Substance Use Topics     Alcohol use: Yes     Comment: Social     Current Outpatient Medications   Medication Sig Dispense Refill     ibuprofen (ADVIL/MOTRIN) 200 MG tablet Take 200 mg by mouth every 4 hours as needed for pain       No Known Allergies      Past medical history, past surgical history, current medications and allergies reviewed and accurate to the best of my knowledge.        OBJECTIVE:     /78 (BP Location: Right arm, Patient Position: Sitting, Cuff Size: Adult Large)   Pulse 72   Temp 97.9  F (36.6  C) (Tympanic)   Resp 16   Ht 1.854 m (6' 1\")   Wt 99.8 kg (220 lb)   SpO2 98%   BMI 29.03 kg/m    Body mass index is 29.03 kg/m .     Physical Exam  General Appearance: Well appearing adult male, appropriate appearance for age. No acute distress  Ears: Left TM intact with bony landmarks appreciated, no erythema, no effusion, no bulging, no purulence.  Right TM intact with bony landmarks appreciated, no erythema, no effusion, no bulging, no purulence.  Left auditory canal clear without drainage or bleeding.  Right auditory canal clear without drainage or bleeding.  Normal external ears, non tender.  Orophayrnx: moist mucous membranes, pharynx with bright erythema, tonsils with 2+ hypertrophy, tonsils with bright erythema, no tonsillar exudates, no oral lesions, palate with swelling and bright erythema, uvula midline with bright erythema, no post nasal drip seen, no trismus, voice clear.    Nose:  No noted drainage or congestion   Neck: supple without adenopathy  Respiratory: normal chest wall and respirations.  Normal effort.  Clear to auscultation bilaterally, no wheezing, crackles or rhonchi.  No increased work of breathing.  No cough appreciated.  Cardiac: RRR with no murmurs  Musculoskeletal:  Equal movement of bilateral upper " extremities.  Equal movement of bilateral lower extremities.  Normal gait.    Psychological: normal affect, alert, oriented, and pleasant.

## 2023-01-23 ENCOUNTER — HEALTH MAINTENANCE LETTER (OUTPATIENT)
Age: 43
End: 2023-01-23

## 2023-09-26 ENCOUNTER — ALLIED HEALTH/NURSE VISIT (OUTPATIENT)
Dept: FAMILY MEDICINE | Facility: OTHER | Age: 43
End: 2023-09-26
Attending: FAMILY MEDICINE
Payer: COMMERCIAL

## 2023-09-26 DIAGNOSIS — Z23 NEED FOR PROPHYLACTIC VACCINATION AND INOCULATION AGAINST INFLUENZA: Primary | ICD-10-CM

## 2023-09-26 PROCEDURE — 90686 IIV4 VACC NO PRSV 0.5 ML IM: CPT

## 2023-09-26 PROCEDURE — 90471 IMMUNIZATION ADMIN: CPT

## 2023-09-26 NOTE — NURSING NOTE
Immunization Documentation    Prior to Immunization administration, verified patients identity using patient's name and date of birth. Please see IMMUNIZATIONS  and order for additional information.  Patient / Parent instructed to remain in clinic for 15 minutes and report any adverse reaction to staff immediately.        Fatou Spears, [facility]  9/26/2023   1:57 PM

## 2024-02-24 ENCOUNTER — HEALTH MAINTENANCE LETTER (OUTPATIENT)
Age: 44
End: 2024-02-24

## 2025-02-24 NOTE — PROGRESS NOTES
Patient here for Covid Testing. Rule out Jorge Roth MA on 11/14/2021 at 9:17 AM     

## 2025-03-09 ENCOUNTER — HEALTH MAINTENANCE LETTER (OUTPATIENT)
Age: 45
End: 2025-03-09

## 2025-05-15 ENCOUNTER — OFFICE VISIT (OUTPATIENT)
Dept: FAMILY MEDICINE | Facility: OTHER | Age: 45
End: 2025-05-15
Attending: FAMILY MEDICINE
Payer: COMMERCIAL

## 2025-05-15 ENCOUNTER — RESULTS FOLLOW-UP (OUTPATIENT)
Dept: FAMILY MEDICINE | Facility: OTHER | Age: 45
End: 2025-05-15

## 2025-05-15 VITALS
SYSTOLIC BLOOD PRESSURE: 112 MMHG | WEIGHT: 224.2 LBS | TEMPERATURE: 97.6 F | OXYGEN SATURATION: 96 % | RESPIRATION RATE: 18 BRPM | DIASTOLIC BLOOD PRESSURE: 80 MMHG | HEIGHT: 73 IN | HEART RATE: 76 BPM | BODY MASS INDEX: 29.72 KG/M2

## 2025-05-15 DIAGNOSIS — Z13.1 SCREENING FOR DIABETES MELLITUS: ICD-10-CM

## 2025-05-15 DIAGNOSIS — B35.1 ONYCHOMYCOSIS: ICD-10-CM

## 2025-05-15 DIAGNOSIS — Z13.6 SCREENING FOR CARDIOVASCULAR CONDITION: ICD-10-CM

## 2025-05-15 DIAGNOSIS — B35.6 TINEA CRURIS: ICD-10-CM

## 2025-05-15 DIAGNOSIS — Z11.4 SCREENING FOR HIV (HUMAN IMMUNODEFICIENCY VIRUS): ICD-10-CM

## 2025-05-15 DIAGNOSIS — Z12.11 SCREEN FOR COLON CANCER: ICD-10-CM

## 2025-05-15 DIAGNOSIS — Z00.00 EXAMINATION, MEDICAL, GENERAL: Primary | ICD-10-CM

## 2025-05-15 LAB
ALBUMIN SERPL BCG-MCNC: 4.9 G/DL (ref 3.5–5.2)
ALBUMIN UR-MCNC: NEGATIVE MG/DL
ALP SERPL-CCNC: 74 U/L (ref 40–150)
ALT SERPL W P-5'-P-CCNC: 45 U/L (ref 0–70)
ANION GAP SERPL CALCULATED.3IONS-SCNC: 13 MMOL/L (ref 7–15)
APPEARANCE UR: CLEAR
AST SERPL W P-5'-P-CCNC: 34 U/L (ref 0–45)
BASOPHILS # BLD AUTO: 0.1 10E3/UL (ref 0–0.2)
BASOPHILS NFR BLD AUTO: 2 %
BILIRUB SERPL-MCNC: 0.7 MG/DL
BILIRUB UR QL STRIP: NEGATIVE
BUN SERPL-MCNC: 9.3 MG/DL (ref 6–20)
CALCIUM SERPL-MCNC: 9.4 MG/DL (ref 8.8–10.4)
CHLORIDE SERPL-SCNC: 105 MMOL/L (ref 98–107)
CHOLEST SERPL-MCNC: 245 MG/DL
COLOR UR AUTO: NORMAL
CREAT SERPL-MCNC: 0.79 MG/DL (ref 0.67–1.17)
EGFRCR SERPLBLD CKD-EPI 2021: >90 ML/MIN/1.73M2
EOSINOPHIL # BLD AUTO: 0.1 10E3/UL (ref 0–0.7)
EOSINOPHIL NFR BLD AUTO: 2 %
ERYTHROCYTE [DISTWIDTH] IN BLOOD BY AUTOMATED COUNT: 12.6 % (ref 10–15)
EST. AVERAGE GLUCOSE BLD GHB EST-MCNC: 100 MG/DL
FASTING STATUS PATIENT QL REPORTED: YES
FASTING STATUS PATIENT QL REPORTED: YES
GLUCOSE SERPL-MCNC: 94 MG/DL (ref 70–99)
GLUCOSE UR STRIP-MCNC: NEGATIVE MG/DL
HBA1C MFR BLD: 5.1 %
HCO3 SERPL-SCNC: 25 MMOL/L (ref 22–29)
HCT VFR BLD AUTO: 43.8 % (ref 40–53)
HDLC SERPL-MCNC: 68 MG/DL
HGB BLD-MCNC: 15.9 G/DL (ref 13.3–17.7)
HGB UR QL STRIP: NEGATIVE
IMM GRANULOCYTES # BLD: 0 10E3/UL
IMM GRANULOCYTES NFR BLD: 0 %
KETONES UR STRIP-MCNC: NEGATIVE MG/DL
LDLC SERPL CALC-MCNC: 114 MG/DL
LEUKOCYTE ESTERASE UR QL STRIP: NEGATIVE
LYMPHOCYTES # BLD AUTO: 1.9 10E3/UL (ref 0.8–5.3)
LYMPHOCYTES NFR BLD AUTO: 42 %
MCH RBC QN AUTO: 34.1 PG (ref 26.5–33)
MCHC RBC AUTO-ENTMCNC: 36.3 G/DL (ref 31.5–36.5)
MCV RBC AUTO: 94 FL (ref 78–100)
MONOCYTES # BLD AUTO: 0.4 10E3/UL (ref 0–1.3)
MONOCYTES NFR BLD AUTO: 8 %
NEUTROPHILS # BLD AUTO: 2.1 10E3/UL (ref 1.6–8.3)
NEUTROPHILS NFR BLD AUTO: 46 %
NITRATE UR QL: NEGATIVE
NONHDLC SERPL-MCNC: 177 MG/DL
NRBC # BLD AUTO: 0 10E3/UL
NRBC BLD AUTO-RTO: 0 /100
PH UR STRIP: 6.5 [PH] (ref 5–9)
PLATELET # BLD AUTO: 252 10E3/UL (ref 150–450)
POTASSIUM SERPL-SCNC: 4.3 MMOL/L (ref 3.4–5.3)
PROT SERPL-MCNC: 7.4 G/DL (ref 6.4–8.3)
RBC # BLD AUTO: 4.66 10E6/UL (ref 4.4–5.9)
SODIUM SERPL-SCNC: 143 MMOL/L (ref 135–145)
SP GR UR STRIP: 1.02 (ref 1–1.03)
TRIGL SERPL-MCNC: 315 MG/DL
TSH SERPL DL<=0.005 MIU/L-ACNC: 2.04 UIU/ML (ref 0.3–4.2)
UROBILINOGEN UR STRIP-MCNC: NORMAL MG/DL
WBC # BLD AUTO: 4.6 10E3/UL (ref 4–11)

## 2025-05-15 PROCEDURE — 82310 ASSAY OF CALCIUM: CPT | Mod: ZL | Performed by: FAMILY MEDICINE

## 2025-05-15 PROCEDURE — 84443 ASSAY THYROID STIM HORMONE: CPT | Mod: ZL | Performed by: FAMILY MEDICINE

## 2025-05-15 PROCEDURE — 80061 LIPID PANEL: CPT | Mod: ZL | Performed by: FAMILY MEDICINE

## 2025-05-15 PROCEDURE — 36415 COLL VENOUS BLD VENIPUNCTURE: CPT | Mod: ZL | Performed by: FAMILY MEDICINE

## 2025-05-15 PROCEDURE — 85025 COMPLETE CBC W/AUTO DIFF WBC: CPT | Mod: ZL | Performed by: FAMILY MEDICINE

## 2025-05-15 PROCEDURE — 81003 URINALYSIS AUTO W/O SCOPE: CPT | Mod: ZL | Performed by: FAMILY MEDICINE

## 2025-05-15 PROCEDURE — 83036 HEMOGLOBIN GLYCOSYLATED A1C: CPT | Mod: ZL | Performed by: FAMILY MEDICINE

## 2025-05-15 RX ORDER — TERBINAFINE HYDROCHLORIDE 250 MG/1
TABLET ORAL
Qty: 49 TABLET | Refills: 0 | Status: SHIPPED | OUTPATIENT
Start: 2025-05-15

## 2025-05-15 RX ORDER — FLUCONAZOLE 100 MG/1
100 TABLET ORAL
Qty: 10 TABLET | Refills: 0 | Status: SHIPPED | OUTPATIENT
Start: 2025-05-15 | End: 2025-05-15

## 2025-05-15 SDOH — HEALTH STABILITY: PHYSICAL HEALTH: ON AVERAGE, HOW MANY DAYS PER WEEK DO YOU ENGAGE IN MODERATE TO STRENUOUS EXERCISE (LIKE A BRISK WALK)?: 1 DAY

## 2025-05-15 ASSESSMENT — ENCOUNTER SYMPTOMS
SORE THROAT: 0
CONSTITUTIONAL NEGATIVE: 1
COUGH: 0
HEMATOLOGIC/LYMPHATIC NEGATIVE: 1
SHORTNESS OF BREATH: 0
ABDOMINAL PAIN: 0
PALPITATIONS: 0
NECK PAIN: 1
WEAKNESS: 0
WHEEZING: 0
COLOR CHANGE: 0
EYE PAIN: 0
CHILLS: 0
VOMITING: 0
FACIAL ASYMMETRY: 0
DYSURIA: 0
SEIZURES: 0
RESPIRATORY NEGATIVE: 1
PSYCHIATRIC NEGATIVE: 1
CARDIOVASCULAR NEGATIVE: 1
BACK PAIN: 1
SLEEP DISTURBANCE: 0
FEVER: 0
ARTHRALGIAS: 0
GASTROINTESTINAL NEGATIVE: 1
HALLUCINATIONS: 0
HEMATURIA: 0
NEUROLOGICAL NEGATIVE: 1

## 2025-05-15 ASSESSMENT — PAIN SCALES - GENERAL: PAINLEVEL_OUTOF10: NO PAIN (0)

## 2025-05-15 ASSESSMENT — SOCIAL DETERMINANTS OF HEALTH (SDOH): HOW OFTEN DO YOU GET TOGETHER WITH FRIENDS OR RELATIVES?: TWICE A WEEK

## 2025-05-15 NOTE — NURSING NOTE
"Chief Complaint   Patient presents with    Physical     Patient presents for an annual physical.  Denies pain at this time.      Initial /80 (BP Location: Right arm, Patient Position: Sitting, Cuff Size: Adult Large)   Pulse 76   Temp 97.6  F (36.4  C) (Temporal)   Resp 18   Ht 1.842 m (6' 0.5\")   Wt 101.7 kg (224 lb 3.2 oz)   SpO2 96%   BMI 29.99 kg/m   Estimated body mass index is 29.99 kg/m  as calculated from the following:    Height as of this encounter: 1.842 m (6' 0.5\").    Weight as of this encounter: 101.7 kg (224 lb 3.2 oz).  Medication Review: complete    The next two questions are to help us understand your food security.  If you are feeling you need any assistance in this area, we have resources available to support you today.          5/15/2025   SDOH- Food Insecurity   Within the past 12 months, did you worry that your food would run out before you got money to buy more? N   Within the past 12 months, did the food you bought just not last and you didn t have money to get more? N         Health Care Directive:  Patient does not have a Health Care Directive: Discussed advance care planning with patient; however, patient declined at this time.    Mary Draper LPN on 5/15/2025 at 9:16 AM        "

## 2025-05-15 NOTE — PROGRESS NOTES
Preventive Care Visit  [facility] DO [place], Family Medicine  May 15, 2025      Assessment & Plan     Mikel was seen today for physical.    Diagnoses and all orders for this visit:    Examination, medical, general  -     CBC with Platelets & Differential; Future  -     Comprehensive metabolic panel; Future  -     Lipid Profile; Future  -     UA Macroscopic with reflex to Microscopic and Culture; Future  -     TSH with free T4 reflex; Future  -     Hemoglobin A1c; Future  -     CBC with Platelets & Differential  -     Comprehensive metabolic panel  -     Lipid Profile  -     UA Macroscopic with reflex to Microscopic and Culture  -     TSH with free T4 reflex  -     Hemoglobin A1c    Screening for diabetes mellitus    Screening for cardiovascular condition    Screen for colon cancer  -     Colonoscopy Screening  Referral; Future    Screening for HIV (human immunodeficiency virus)  -     HIV Screening; Future  -     HIV Screening    Tinea cruris  -     Discontinue: fluconazole (DIFLUCAN) 100 MG tablet; Take 1 tablet (100 mg) by mouth every 3 days.  -     terbinafine (LAMISIL) 250 MG tablet; Take one pill daily for 7 days than stop for 21 days.  Repeat 5 times for total of 6 month treatment    Onychomycosis  -     terbinafine (LAMISIL) 250 MG tablet; Take one pill daily for 7 days than stop for 21 days.  Repeat 5 times for total of 6 month treatment       Will be sent for all standard labs.  Patient will be sent for colonoscopy at this time.  Guards to his onychomycosis and his tinea cruris I will treat with Lamisil pulsed dosing which should cover the tinea cruris.  If he is finds that his toenail does not clear in 6 months he should return.  In regards to his knuckles I do not think they are abnormal.  And certainly he is talking about his PIP joints which would be inconsistent with RA.  I will not recheck and told him is basically normal in appearance.  Concerning the  "skin tag he can return and have it removed if he chooses.  The rest of his skin exam was normal.                BMI  Estimated body mass index is 29.99 kg/m  as calculated from the following:    Height as of this encounter: 1.842 m (6' 0.5\").    Weight as of this encounter: 101.7 kg (224 lb 3.2 oz).       Counseling  Appropriate preventive services were addressed with this patient via screening, questionnaire, or discussion as appropriate for fall prevention, nutrition, physical activity, Tobacco-use cessation, social engagement, weight loss and cognition.  Checklist reviewing preventive services available has been given to the patient.  Reviewed patient's diet, addressing concerns and/or questions.   He is at risk for lack of exercise and has been provided with information to increase physical activity for the benefit of his well-being.   He is at risk for psychosocial distress and has been provided with information to reduce risk.           Jaime Morin is a 45 year old, presenting for the following:  Physical        5/15/2025     9:07 AM   Additional Questions   Roomed by Mary Draper LPN   Accompanied by self          HPI  Patient presents for annual exam.  He has a couple concerns 1 he states that he has toenail fungus that he would like me to look at.  He states he thinks he has an enlarged knuckles.  He does have a extensive family history of RA but has been tested multiple times and it has always been negative.  Next he has a skin tag in his right axilla he would like me to look at and moles on his back that his wife would like me to look at.         Advance Care Planning    Advance care planning document is on file but is outdated.  Patient encouraged to update or provider to update POLST.        5/15/2025   General Health   How would you rate your overall physical health? Good   Feel stress (tense, anxious, or unable to sleep) To some extent   (!) STRESS CONCERN      5/15/2025   Nutrition   Three " or more servings of calcium each day? (!) NO   Diet: Regular (no restrictions)   How many servings of fruit and vegetables per day? (!) 0-1   How many sweetened beverages each day? 0-1         5/15/2025   Exercise   Days per week of moderate/strenous exercise 1 day   (!) EXERCISE CONCERN      5/15/2025   Social Factors   Frequency of gathering with friends or relatives Twice a week   Worry food won't last until get money to buy more No   Food not last or not have enough money for food? No   Do you have housing? (Housing is defined as stable permanent housing and does not include staying outside in a car, in a tent, in an abandoned building, in an overnight shelter, or couch-surfing.) Yes   Are you worried about losing your housing? No   Lack of transportation? No   Unable to get utilities (heat,electricity)? No         5/15/2025   Dental   Dentist two times every year? Yes         Today's PHQ-2 Score:       5/15/2025     8:59 AM   PHQ-2 ( 1999 [employer])   Q1: Little interest or pleasure in doing things 0   Q2: Feeling down, depressed or hopeless 0   PHQ-2 Score 0    Q1: Little interest or pleasure in doing things Not at all   Q2: Feeling down, depressed or hopeless Not at all   PHQ-2 Score 0       Patient-reported           5/15/2025   Substance Use   Alcohol more than 3/day or more than 7/wk Not Applicable   Do you use any other substances recreationally? No     Social History     Tobacco Use    Smoking status: Some Days     Types: Cigarettes    Smokeless tobacco: Never    Tobacco comments:     Occasional- 1 pack in 2 weeks   Vaping Use    Vaping status: Never Used   Substance Use Topics    Alcohol use: Yes     Comment: Social    Drug use: No             5/15/2025   One time HIV Screening   Previous HIV test? No         5/15/2025   STI Screening   New sexual partner(s) since last STI/HIV test? No   ASCVD Risk   The 10-year ASCVD risk score (Beena FERRER, et al., 2019) is: 4.5%    Values used to calculate the  score:      Age: 45 years      Sex: Male      Is Non- : No      Diabetic: No      Tobacco smoker: Yes      Systolic Blood Pressure: 112 mmHg      Is BP treated: No      HDL Cholesterol: 68 mg/dL      Total Cholesterol: 245 mg/dL        5/15/2025   Contraception/Family Planning   Questions about contraception or family planning No        Reviewed and updated as needed this visit by Provider   Tobacco  Allergies  Meds  Problems  Med Hx  Surg Hx  Fam Hx            Past Medical History:   Diagnosis Date    Personal history of other medical treatment (CODE)     No Comments Provided     Past Surgical History:   Procedure Laterality Date    APPENDECTOMY OPEN      No Comments Provided    TYMPANOSTOMY, LOCAL/TOPICAL ANESTHESIA      No Comments Provided     Current Outpatient Medications   Medication Sig Dispense Refill    ibuprofen (ADVIL/MOTRIN) 200 MG tablet Take 200 mg by mouth every 4 hours as needed for pain      terbinafine (LAMISIL) 250 MG tablet Take one pill daily for 7 days than stop for 21 days.  Repeat 5 times for total of 6 month treatment 49 tablet 0     No Known Allergies    Review of Systems   Constitutional: Negative.  Negative for chills and fever.   HENT:  Positive for congestion. Negative for ear pain and sore throat.    Eyes:  Negative for pain and visual disturbance.   Respiratory: Negative.  Negative for cough, shortness of breath and wheezing.    Cardiovascular: Negative.  Negative for chest pain, palpitations and leg swelling.   Gastrointestinal: Negative.  Negative for abdominal pain and vomiting.   Genitourinary:  Negative for dysuria, hematuria, penile discharge, penile swelling, scrotal swelling and testicular pain.   Musculoskeletal:  Positive for back pain and neck pain. Negative for arthralgias.   Skin:  Negative for color change and rash.   Allergic/Immunologic: Positive for environmental allergies.   Neurological: Negative.  Negative for seizures, syncope,  "facial asymmetry and weakness.   Hematological: Negative.    Psychiatric/Behavioral: Negative.  Negative for hallucinations and sleep disturbance.    All other systems reviewed and are negative.        Objective    Exam  /80 (BP Location: Right arm, Patient Position: Sitting, Cuff Size: Adult Large)   Pulse 76   Temp 97.6  F (36.4  C) (Temporal)   Resp 18   Ht 1.842 m (6' 0.5\")   Wt 101.7 kg (224 lb 3.2 oz)   SpO2 96%   BMI 29.99 kg/m     Estimated body mass index is 29.99 kg/m  as calculated from the following:    Height as of this encounter: 1.842 m (6' 0.5\").    Weight as of this encounter: 101.7 kg (224 lb 3.2 oz).    Physical Exam  Constitutional:       Appearance: Normal appearance.   HENT:      Head: Normocephalic.      Right Ear: Tympanic membrane normal.      Left Ear: Tympanic membrane normal.      Nose: Nose normal.      Mouth/Throat:      Mouth: Mucous membranes are moist.      Pharynx: Oropharynx is clear.   Eyes:      Conjunctiva/sclera: Conjunctivae normal.   Cardiovascular:      Rate and Rhythm: Normal rate and regular rhythm.      Heart sounds: Normal heart sounds.   Pulmonary:      Effort: Pulmonary effort is normal.      Breath sounds: Normal breath sounds.   Abdominal:      General: Abdomen is flat.      Palpations: Abdomen is soft.      Hernia: A hernia is present.   Musculoskeletal:         General: Normal range of motion.      Cervical back: Neck supple.      Comments: Both hands evaluated showing normal PIP MP and DIP joints.  I see no nodules or enlargement no tophi are noted   Skin:     General: Skin is warm and dry.      Comments: Patient has a rash on the right inguinal area it is a scaling red rash with parchment thin paper skin.  Right foot fifth toenail does show smoky appearance to the nail consistent with onychomycosis.  The rest of the nails are clear at this time.   Neurological:      General: No focal deficit present.      Mental Status: He is alert and oriented to " person, place, and time. Mental status is at baseline.   Psychiatric:         Mood and Affect: Mood normal.         Behavior: Behavior normal.               Signed Electronically by: Rogelio Mata DO

## 2025-07-15 ENCOUNTER — ANESTHESIA EVENT (OUTPATIENT)
Dept: SURGERY | Facility: OTHER | Age: 45
End: 2025-07-15
Payer: COMMERCIAL

## 2025-07-15 ENCOUNTER — HOSPITAL ENCOUNTER (OUTPATIENT)
Facility: OTHER | Age: 45
Discharge: HOME | End: 2025-07-15
Attending: SURGERY | Admitting: SURGERY
Payer: COMMERCIAL

## 2025-07-15 ENCOUNTER — ANESTHESIA (OUTPATIENT)
Dept: SURGERY | Facility: OTHER | Age: 45
End: 2025-07-15
Payer: COMMERCIAL

## 2025-07-15 VITALS
RESPIRATION RATE: 12 BRPM | DIASTOLIC BLOOD PRESSURE: 81 MMHG | HEART RATE: 52 BPM | OXYGEN SATURATION: 98 % | WEIGHT: 224 LBS | BODY MASS INDEX: 30.34 KG/M2 | SYSTOLIC BLOOD PRESSURE: 136 MMHG | TEMPERATURE: 96.6 F | HEIGHT: 72 IN

## 2025-07-15 PROCEDURE — G0121 COLON CA SCRN NOT HI RSK IND: HCPCS | Performed by: SURGERY

## 2025-07-15 PROCEDURE — 999N000010 HC STATISTIC ANES STAT CODE-CRNA PER MINUTE: Performed by: SURGERY

## 2025-07-15 PROCEDURE — 250N000009 HC RX 250: Performed by: SURGERY

## 2025-07-15 PROCEDURE — 258N000003 HC RX IP 258 OP 636: Performed by: SURGERY

## 2025-07-15 PROCEDURE — 250N000011 HC RX IP 250 OP 636

## 2025-07-15 PROCEDURE — 45378 DIAGNOSTIC COLONOSCOPY: CPT | Performed by: SURGERY

## 2025-07-15 RX ORDER — NALOXONE HYDROCHLORIDE 0.4 MG/ML
0.4 INJECTION, SOLUTION INTRAMUSCULAR; INTRAVENOUS; SUBCUTANEOUS
Status: DISCONTINUED | OUTPATIENT
Start: 2025-07-15 | End: 2025-07-15 | Stop reason: HOSPADM

## 2025-07-15 RX ORDER — NALOXONE HYDROCHLORIDE 0.4 MG/ML
0.2 INJECTION, SOLUTION INTRAMUSCULAR; INTRAVENOUS; SUBCUTANEOUS
Status: DISCONTINUED | OUTPATIENT
Start: 2025-07-15 | End: 2025-07-15 | Stop reason: HOSPADM

## 2025-07-15 RX ORDER — FLUMAZENIL 0.1 MG/ML
0.2 INJECTION, SOLUTION INTRAVENOUS
Status: DISCONTINUED | OUTPATIENT
Start: 2025-07-15 | End: 2025-07-15 | Stop reason: HOSPADM

## 2025-07-15 RX ORDER — LIDOCAINE 40 MG/G
CREAM TOPICAL
Status: DISCONTINUED | OUTPATIENT
Start: 2025-07-15 | End: 2025-07-15 | Stop reason: HOSPADM

## 2025-07-15 RX ORDER — PROPOFOL 10 MG/ML
INJECTION, EMULSION INTRAVENOUS PRN
Status: DISCONTINUED | OUTPATIENT
Start: 2025-07-15 | End: 2025-07-15

## 2025-07-15 RX ORDER — PROPOFOL 10 MG/ML
INJECTION, EMULSION INTRAVENOUS CONTINUOUS PRN
Status: DISCONTINUED | OUTPATIENT
Start: 2025-07-15 | End: 2025-07-15

## 2025-07-15 RX ORDER — SODIUM CHLORIDE, SODIUM LACTATE, POTASSIUM CHLORIDE, CALCIUM CHLORIDE 600; 310; 30; 20 MG/100ML; MG/100ML; MG/100ML; MG/100ML
INJECTION, SOLUTION INTRAVENOUS CONTINUOUS
Status: DISCONTINUED | OUTPATIENT
Start: 2025-07-15 | End: 2025-07-15 | Stop reason: HOSPADM

## 2025-07-15 RX ADMIN — PROPOFOL 100 MG: 10 INJECTION, EMULSION INTRAVENOUS at 12:17

## 2025-07-15 RX ADMIN — LIDOCAINE HYDROCHLORIDE 0.1 ML: 10 INJECTION, SOLUTION EPIDURAL; INFILTRATION; INTRACAUDAL; PERINEURAL at 11:20

## 2025-07-15 RX ADMIN — MIDAZOLAM HYDROCHLORIDE 1 MG: 1 INJECTION, SOLUTION INTRAMUSCULAR; INTRAVENOUS at 12:20

## 2025-07-15 RX ADMIN — SODIUM CHLORIDE, SODIUM LACTATE, POTASSIUM CHLORIDE, AND CALCIUM CHLORIDE 30 ML/HR: .6; .31; .03; .02 INJECTION, SOLUTION INTRAVENOUS at 11:21

## 2025-07-15 RX ADMIN — PROPOFOL 140 MCG/KG/MIN: 10 INJECTION, EMULSION INTRAVENOUS at 12:18

## 2025-07-15 RX ADMIN — MIDAZOLAM HYDROCHLORIDE 1 MG: 1 INJECTION, SOLUTION INTRAMUSCULAR; INTRAVENOUS at 12:22

## 2025-07-15 ASSESSMENT — LIFESTYLE VARIABLES: TOBACCO_USE: 1

## 2025-07-15 ASSESSMENT — ACTIVITIES OF DAILY LIVING (ADL)
ADLS_ACUITY_SCORE: 41

## 2025-07-15 NOTE — H&P
GENERAL SURGERY CONSULTATION NOTE    Mikel Lincoln   [address]  [facility] [address]  45 year old  male    Primary Care Provider:  No Ref-Primary, Physician      HPI: Mikel Lincoln presents to day surgery in need of colonoscopy for screening for colon cancer.   Mikel Lincoln denies family history of colon cancer. Patient denies change in bowel habits or blood in stools. Previous colonoscopy was never.     REVIEW OF SYSTEMS:    GENERAL: No fevers or chills. Denies fatigue, recent weight loss.  HEENT: No sinus drainage. No changes with vision or hearing. No difficulty swallowing.   LYMPHATICS:  Noswollen nodes in axilla, neck or groin.  CARDIOVASCULAR: Denies chest pain, palpitations and dyspnea on exertion.  PULMONARY: No shortness of breath or cough. No increase in sputum production.  GI: Denies melena,bright red blood in stools. No hematemesis. No constipation or diarrhea.  : No dysuria or hematuria.  SKIN: No recent rashes or ulcers.   HEMATOLOGY:  No history of easy bruising or bleeding.  ENDOCRINE:  No history of diabetes or thyroid problems.  NEUROLOGY:  No history of seizures or headaches. No motor or sensory changes.        Patient Active Problem List   Diagnosis    Athlete's foot    Jock itch       Past Medical History:   Diagnosis Date    Personal history of other medical treatment (CODE)     No Comments Provided       Past Surgical History:   Procedure Laterality Date    APPENDECTOMY OPEN      No Comments Provided    TYMPANOSTOMY, LOCAL/TOPICAL ANESTHESIA      No Comments Provided       Family History   Problem Relation Age of Onset    Arthritis Father         Arthritis,RA    Arthritis Paternal Grandmother         Arthritis,RA    Family History Negative Mother         Good Health       Social History     Social History Narrative    His wife grew up here in [place]. She is a teacher at the Middle school for math. They moved here from [place].    He is from [place].  He is  working from home and travels a lot. 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      Gideon 1/12  Age 3   Allison twin 9/23/14  Harmeet twin 9/23/14       Social History     Socioeconomic History    Marital status:      Spouse name: Not on file    Number of children: Not on file    Years of education: Not on file    Highest education level: Not on file   Occupational History    Not on file   Tobacco Use    Smoking status: Some Days     Types: Cigarettes    Smokeless tobacco: Never    Tobacco comments:     Occasional- 1 pack in 2 weeks   Vaping Use    Vaping status: Never Used   Substance and Sexual Activity    Alcohol use: Yes     Comment: Social    Drug use: No    Sexual activity: Yes   Other Topics Concern    Parent/sibling w/ CABG, MI or angioplasty before 65F 55M? Not Asked   Social History Narrative    His wife grew up here in [place]. She is a teacher at the Middle school for math. They moved here from [place].    [place]  He is working from home and travels a lot. 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      Gidoen 1/12  Age 4   Allison twin 9/23/14  Harmeet twin 9/23/14     Social Drivers of Health     Financial Resource Strain: Low Risk  (5/15/2025)    Financial Resource Strain     Within the past 12 months, have you or your family members you live with been unable to get utilities (heat, electricity) when it was really needed?: No   Food Insecurity: Low Risk  (5/15/2025)    Food Insecurity     Within the past 12 months, did you worry that your food would run out before you got money to buy more?: No     Within the past 12 months, did the food you bought just not last and you didn t have money to get more?: No   Transportation Needs: Low Risk  (5/15/2025)    Transportation Needs     Within the past 12 months, has lack of transportation kept you from medical appointments, getting your medicines, non-medical meetings or  appointments, work, or from getting things that you need?: No   Physical Activity: Unknown (5/15/2025)    Exercise Vital Sign     Days of Exercise per Week: 1 day     Minutes of Exercise per Session: Not on file   Stress: Stress Concern Present (5/15/2025)    [employer] - Occupational Stress Questionnaire     Feeling of Stress : To some extent   Social Connections: Unknown (5/15/2025)    Social Connection and Isolation Panel [NHANES]     Frequency of Communication with Friends and Family: Not on file     Frequency of Social Gatherings with Friends and Family: Twice a week     Attends Evangelical Services: Not on file     Active Member of Clubs or Organizations: Not on file     Attends Club or Organization Meetings: Not on file     Marital Status: Not on file   Interpersonal Safety: High Risk (7/15/2025)    Interpersonal Safety     Do you feel physically and emotionally safe where you currently live?: No     Within the past 12 months, have you been hit, slapped, kicked or otherwise physically hurt by someone?: No     Within the past 12 months, have you been humiliated or emotionally abused in other ways by your partner or ex-partner?: No   Housing Stability: Low Risk  (5/15/2025)    Housing Stability     Do you have housing? : Yes     Are you worried about losing your housing?: No       Current Facility-Administered Medications   Medication Dose Route Frequency Provider Last Rate Last Admin    lactated ringers infusion   Intravenous Continuous Wil Russ MD        lidocaine (LMX4) cream   Topical Q1H PRN Wil Russ MD        lidocaine 1 % 0.1-1 mL  0.1-1 mL Other Q1H PRN Wil Russ MD        sodium chloride (PF) 0.9% PF flush 3 mL  3 mL Intracatheter Q8H Wil Russ MD        sodium chloride (PF) 0.9% PF flush 3 mL  3 mL Intracatheter q1 min prn Wil Russ MD             ALLERGIES/SENSITIVITIES: No Known Allergies    PHYSICAL  EXAM:     BP (!) 152/92   Pulse 54   Temp 97.7  F (36.5  C) (Tympanic)   Resp 12   Ht 1.829 m (6')   Wt 101.6 kg (224 lb)   SpO2 98%   BMI 30.38 kg/m      General Appearance:   Sitting up in bed, no apparent distress  HEENT: Pupils are equal and reactive, no scleral icterus   Heart & CV:  RRR, no murmur.  LUNGS: No increased work of breathing. Lungs are CTA B/L, no wheezing or crackles.  Abd:  soft, non-tender, no masses   Ext: no lower extremity edema   Neuro: alert and oriented, normal speech and mentation         CONSULTATION ASSESSMENT AND PLAN:    45 year old male with average risk for colon cancer.      The technical details of colonoscopy were discussed with the patient along with the risks and benefits to include bleeding, perforation and incomplete study. Mikel Lincoln demonstrated understanding and is willing to proceed.       Wil Russ MD on 7/15/2025 at 11:09 AM

## 2025-07-15 NOTE — DISCHARGE INSTRUCTIONS
[facility]  Adult Discharge Orders & Instructions      For 24 hours after surgery:  Get plenty of rest.  A responsible adult must stay with you for at least 24 hours after you leave the hospital.   You may feel lightheaded.  IF so, sit for a few minutes before standing.  Have someone help you get up.   You may have a slight fever. Call the doctor if your fever is over 101 F (38.3 C) (taken under the tongue) or lasts longer than 24 hours.  You may have a dry mouth, a sore throat, muscle aches or trouble sleeping.  These should go away after 24 hours.  Do not make important or legal decisions.  6.   Do not drive or use heavy equipment.  If you have weakness or tingling, don't drive or use heavy equipment until this feeling goes away.                                                                                                                                                                         To contact a doctor, call    [phone]______________

## 2025-07-15 NOTE — OP NOTE
COLONOSCOPY PROCEDURE NOTE    DATE OF SERVICE: 7/15/2025    SURGEON: JUAN Russ MD     PRE-OP DIAGNOSIS:    Screening for colon cancer     POST-OP DIAGNOSIS:    Normal Exam    PROCEDURE:   Colonoscopy    ASSISTANT:  Circulator: Hope Anderson RN; Lesia Contreras RN  Scrub Person: Nafisa Aquino    ANESTHESIA:  MAC                            MACCRNA Independent: Petra Mayfield APRN CRNA    INDICATION FOR THE PROCEDURE: The patient is a 45 year old male with average risk for colon cancer. The patient has no other complaints.  After explaining the risks to include bleeding, perforation, potential inability to reach the cecum the patient wishes to proceed.    PROCEDURE: After adequate sedation, the patient was in the left lateral decubitus position.  Rectal exam was performed.  There was normal tone and no palpable masses.  The colonoscope was introduced into the rectum and advanced to the cecum with Mild difficulty.  The patient's prep was excellent.  The terminal cecum was reached.  The cecum, ascending, transverse, descending and sigmoid colon were grossly unremarkable .  The scope was retroflexed in the rectum.  The anorectal junction was unremarkable.  The scope was straightened and removed.  The patient tolerated the procedure well.     ESTIMATED BLOOD LOSS: none    COMPLICATIONS:  None    TISSUE REMOVED:  No    RECOMMEND:    Follow-up in 10 years      JUAN Russ MD

## 2025-07-15 NOTE — ANESTHESIA PREPROCEDURE EVALUATION
Anesthesia Pre-Procedure Evaluation    Patient: Mikel Linclon   MRN: [id number] : 1980          Procedure : Procedure(s):  Colonoscopy         Past Medical History:   Diagnosis Date    Personal history of other medical treatment (CODE)     No Comments Provided      Past Surgical History:   Procedure Laterality Date    APPENDECTOMY OPEN      No Comments Provided    TYMPANOSTOMY, LOCAL/TOPICAL ANESTHESIA      No Comments Provided      No Known Allergies   Social History     Tobacco Use    Smoking status: Some Days     Types: Cigarettes    Smokeless tobacco: Never    Tobacco comments:     Occasional- 1 pack in 2 weeks   Substance Use Topics    Alcohol use: Yes     Comment: Social      Wt Readings from Last 1 Encounters:   07/15/25 101.6 kg (224 lb)        Anesthesia Evaluation   Pt has had prior anesthetic. Type: General (Tonsils and Appy).    No history of anesthetic complications       ROS/MED HX  ENT/Pulmonary: Comment: Intermittently smokes    (+)                tobacco use, Current use,                       Neurologic:  - neg neurologic ROS     Cardiovascular:  - neg cardiovascular ROS     METS/Exercise Tolerance: >4 METS    Hematologic:  - neg hematologic  ROS     Musculoskeletal:  - neg musculoskeletal ROS     GI/Hepatic:     (+)        bowel prep,            Renal/Genitourinary:  - neg Renal ROS     Endo:  - neg endo ROS     Psychiatric/Substance Use:  - neg psychiatric ROS     Infectious Disease:  - neg infectious disease ROS     Malignancy:  - neg malignancy ROS     Other:  - neg other ROS            Physical Exam  Airway  Mallampati: II  TM distance: >3 FB  Neck ROM: full  Mouth opening: < 4 cm    Cardiovascular - normal exam  Rhythm: regular  Rate: normal rate     Dental   (+) Minor Abnormalities - some fillings, tiny chips      Pulmonary - normal examBreath sounds clear to auscultation        Neurological - normal exam  He appears awake, alert and oriented x3.    Other Findings       OUTSIDE  "LABS:  CBC:   Lab Results   Component Value Date    WBC 4.6 05/15/2025    HGB 15.9 05/15/2025    HGB 16.5 03/09/2016    HCT 43.8 05/15/2025    HCT 46.5 03/09/2016     05/15/2025     03/09/2016     BMP:   Lab Results   Component Value Date     05/15/2025     03/09/2016    POTASSIUM 4.3 05/15/2025    POTASSIUM 3.9 03/09/2016    CHLORIDE 105 05/15/2025    CHLORIDE 99 03/09/2016    CO2 25 05/15/2025    CO2 31 03/09/2016    BUN 9.3 05/15/2025    BUN 13 03/09/2016    CR 0.79 05/15/2025    CR 0.86 03/09/2016    GLC 94 05/15/2025     03/09/2016     COAGS: No results found for: \"PTT\", \"INR\", \"FIBR\"  POC: No results found for: \"BGM\", \"HCG\", \"HCGS\"  HEPATIC:   Lab Results   Component Value Date    ALBUMIN 4.9 05/15/2025    PROTTOTAL 7.4 05/15/2025    ALT 45 05/15/2025    AST 34 05/15/2025    ALKPHOS 74 05/15/2025    BILITOTAL 0.7 05/15/2025     OTHER:   Lab Results   Component Value Date    A1C 5.1 05/15/2025    TERESA 9.4 05/15/2025    TSH 2.04 05/15/2025    SED 2 03/09/2016       Anesthesia Plan    ASA Status:  1      NPO Status: NPO Appropriate   Anesthesia Type: MAC.  Airway: natural airway.  Induction: intravenous.  Maintenance: N/A.   Techniques and Equipment:       - Monitoring Plan: standard ASA monitoring     Consents    Anesthesia Plan(s) and associated risks, benefits, and realistic alternatives discussed. Questions answered and patient/representative(s) expressed understanding.     - Discussed:     - Discussed with:  Patient, family               Postoperative Care         Comments:                   JONY Lewis CRNA    I have reviewed the pertinent notes and labs in the chart from the past 30 days and (re)examined the patient.  Any updates or changes from those notes are reflected in this note.    Clinically Significant Risk Factors Present on Admission                             # Obesity: Estimated body mass index is 30.38 kg/m  as calculated from the following:    Height as " of this encounter: 1.829 m (6').    Weight as of this encounter: 101.6 kg (224 lb).

## 2025-07-15 NOTE — OR NURSING
Patient has been discharged to home at 1320 via ambulatory accompanied by Andre GROSS    Written discharge instructions were provided to patient.  Prescriptions were none.  Patient states their pain is none, and denies any nausea or dizziness upon discharge.    Patient and adult caring for them verbalize understanding of discharge instructions including no driving until tomorrow and no longer taking narcotic pain medications - no operating mechanical equipment and no making any important decisions.They understand reason for discharge, and necessary follow-up appointments.

## 2025-07-15 NOTE — ANESTHESIA CARE TRANSFER NOTE
Patient: Mikel Lincoln    Procedure: Procedure(s):  Colonoscopy       Diagnosis: Colon cancer screening [Z12.11]  Diagnosis Additional Information: No value filed.    Anesthesia Type:   MAC     Note:    Oropharynx: oropharynx clear of all foreign objects and spontaneously breathing  Level of Consciousness: awake  Oxygen Supplementation: face mask  Level of Supplemental Oxygen (L/min / FiO2): 6  Independent Airway: airway patency satisfactory and stable  Dentition: dentition unchanged  Vital Signs Stable: post-procedure vital signs reviewed and stable  Report to RN Given: handoff report given  Patient transferred to: Phase II    Handoff Report: Identifed the Patient, Identified the Reponsible Provider, Reviewed the pertinent medical history, Discussed the surgical course, Reviewed Intra-OP anesthesia mangement and issues during anesthesia, Set expectations for post-procedure period and Allowed opportunity for questions and acknowledgement of understanding    Vitals:  Vitals Value Taken Time   BP     Temp     Pulse     Resp     SpO2         Electronically Signed By: JONY Crandall CRNA  July 15, 2025  12:41 PM

## 2025-07-15 NOTE — ANESTHESIA POSTPROCEDURE EVALUATION
Patient: Mikel Lincoln    Procedure: Procedure(s):  Colonoscopy       Anesthesia Type:  MAC    Note:  Disposition: Outpatient   Postop Pain Control: Uneventful            Sign Out: Well controlled pain   PONV: No   Neuro/Psych: Uneventful            Sign Out: Acceptable/Baseline neuro status   Airway/Respiratory: Uneventful            Sign Out: Acceptable/Baseline resp. status   CV/Hemodynamics: Uneventful            Sign Out: Acceptable CV status; No obvious hypovolemia; No obvious fluid overload   Other NRE: NONE   DID A NON-ROUTINE EVENT OCCUR?            Last vitals:  Vitals Value Taken Time   /81 07/15/25 13:00   Temp 96.6  F (35.9  C) 07/15/25 12:43   Pulse 52 07/15/25 13:00   Resp 12 07/15/25 12:43   SpO2 98 % 07/15/25 13:15       Electronically Signed By: JONY HINSON CRNA  July 15, 2025  1:39 PM

## (undated) DEVICE — TUBING SUCTION 10'X3/16" N510

## (undated) DEVICE — ENDO KIT COMPLIANCE DYKENDOCMPLY

## (undated) DEVICE — ENDO BRUSH CHANNEL MASTER CLEANING 2-4.2MM BW-412T

## (undated) DEVICE — SUCTION MANIFOLD NEPTUNE 2 SYS 4 PORT 0702-020-000

## (undated) DEVICE — SOL WATER 1500ML

## (undated) RX ORDER — PROPOFOL 10 MG/ML
INJECTION, EMULSION INTRAVENOUS
Status: DISPENSED
Start: 2025-07-15

## (undated) RX ORDER — LIDOCAINE HYDROCHLORIDE 10 MG/ML
INJECTION, SOLUTION EPIDURAL; INFILTRATION; INTRACAUDAL; PERINEURAL
Status: DISPENSED
Start: 2025-07-15